# Patient Record
Sex: MALE | Race: OTHER | Employment: UNEMPLOYED | URBAN - METROPOLITAN AREA
[De-identification: names, ages, dates, MRNs, and addresses within clinical notes are randomized per-mention and may not be internally consistent; named-entity substitution may affect disease eponyms.]

---

## 2024-03-11 ENCOUNTER — HOSPITAL ENCOUNTER (EMERGENCY)
Facility: HOSPITAL | Age: 34
Discharge: HOME | End: 2024-03-11
Attending: EMERGENCY MEDICINE | Admitting: EMERGENCY MEDICINE

## 2024-03-11 ENCOUNTER — APPOINTMENT (EMERGENCY)
Dept: RADIOLOGY | Facility: HOSPITAL | Age: 34
End: 2024-03-11

## 2024-03-11 VITALS
TEMPERATURE: 99.2 F | OXYGEN SATURATION: 95 % | SYSTOLIC BLOOD PRESSURE: 134 MMHG | RESPIRATION RATE: 18 BRPM | WEIGHT: 208.78 LBS | DIASTOLIC BLOOD PRESSURE: 79 MMHG | HEART RATE: 114 BPM

## 2024-03-11 DIAGNOSIS — R06.00 DYSPNEA: ICD-10-CM

## 2024-03-11 DIAGNOSIS — J45.901 ASTHMA EXACERBATION: Primary | ICD-10-CM

## 2024-03-11 DIAGNOSIS — I10 HTN (HYPERTENSION): ICD-10-CM

## 2024-03-11 LAB
ALBUMIN SERPL BCP-MCNC: 4.8 G/DL (ref 3.5–5)
ALP SERPL-CCNC: 57 U/L (ref 34–104)
ALT SERPL W P-5'-P-CCNC: 72 U/L (ref 7–52)
ANION GAP SERPL CALCULATED.3IONS-SCNC: 10 MMOL/L
AST SERPL W P-5'-P-CCNC: 27 U/L (ref 13–39)
ATRIAL RATE: 135 BPM
BASOPHILS # BLD AUTO: 0.06 THOUSANDS/ÂΜL (ref 0–0.1)
BASOPHILS NFR BLD AUTO: 1 % (ref 0–1)
BILIRUB SERPL-MCNC: 0.47 MG/DL (ref 0.2–1)
BUN SERPL-MCNC: 7 MG/DL (ref 5–25)
CALCIUM SERPL-MCNC: 9.7 MG/DL (ref 8.4–10.2)
CARDIAC TROPONIN I PNL SERPL HS: <2 NG/L
CHLORIDE SERPL-SCNC: 103 MMOL/L (ref 96–108)
CO2 SERPL-SCNC: 23 MMOL/L (ref 21–32)
CREAT SERPL-MCNC: 0.71 MG/DL (ref 0.6–1.3)
EOSINOPHIL # BLD AUTO: 1.16 THOUSAND/ÂΜL (ref 0–0.61)
EOSINOPHIL NFR BLD AUTO: 12 % (ref 0–6)
ERYTHROCYTE [DISTWIDTH] IN BLOOD BY AUTOMATED COUNT: 14.6 % (ref 11.6–15.1)
FLUAV RNA RESP QL NAA+PROBE: NEGATIVE
FLUBV RNA RESP QL NAA+PROBE: NEGATIVE
GFR SERPL CREATININE-BSD FRML MDRD: 125 ML/MIN/1.73SQ M
GLUCOSE SERPL-MCNC: 111 MG/DL (ref 65–140)
HCT VFR BLD AUTO: 47.1 % (ref 36.5–49.3)
HGB BLD-MCNC: 15.5 G/DL (ref 12–17)
IMM GRANULOCYTES # BLD AUTO: 0.03 THOUSAND/UL (ref 0–0.2)
IMM GRANULOCYTES NFR BLD AUTO: 0 % (ref 0–2)
LYMPHOCYTES # BLD AUTO: 2.86 THOUSANDS/ÂΜL (ref 0.6–4.47)
LYMPHOCYTES NFR BLD AUTO: 30 % (ref 14–44)
MAGNESIUM SERPL-MCNC: 2.1 MG/DL (ref 1.9–2.7)
MCH RBC QN AUTO: 26.9 PG (ref 26.8–34.3)
MCHC RBC AUTO-ENTMCNC: 32.9 G/DL (ref 31.4–37.4)
MCV RBC AUTO: 82 FL (ref 82–98)
MONOCYTES # BLD AUTO: 0.64 THOUSAND/ÂΜL (ref 0.17–1.22)
MONOCYTES NFR BLD AUTO: 7 % (ref 4–12)
NEUTROPHILS # BLD AUTO: 4.9 THOUSANDS/ÂΜL (ref 1.85–7.62)
NEUTS SEG NFR BLD AUTO: 50 % (ref 43–75)
NRBC BLD AUTO-RTO: 0 /100 WBCS
P AXIS: 84 DEGREES
PLATELET # BLD AUTO: 337 THOUSANDS/UL (ref 149–390)
PMV BLD AUTO: 10.7 FL (ref 8.9–12.7)
POTASSIUM SERPL-SCNC: 3.9 MMOL/L (ref 3.5–5.3)
PR INTERVAL: 138 MS
PROT SERPL-MCNC: 8.3 G/DL (ref 6.4–8.4)
QRS AXIS: 82 DEGREES
QRSD INTERVAL: 82 MS
QT INTERVAL: 298 MS
QTC INTERVAL: 447 MS
RBC # BLD AUTO: 5.76 MILLION/UL (ref 3.88–5.62)
RSV RNA RESP QL NAA+PROBE: NEGATIVE
SARS-COV-2 RNA RESP QL NAA+PROBE: NEGATIVE
SODIUM SERPL-SCNC: 136 MMOL/L (ref 135–147)
T WAVE AXIS: 46 DEGREES
VENTRICULAR RATE: 135 BPM
WBC # BLD AUTO: 9.65 THOUSAND/UL (ref 4.31–10.16)

## 2024-03-11 PROCEDURE — 96361 HYDRATE IV INFUSION ADD-ON: CPT

## 2024-03-11 PROCEDURE — 80053 COMPREHEN METABOLIC PANEL: CPT | Performed by: EMERGENCY MEDICINE

## 2024-03-11 PROCEDURE — 96375 TX/PRO/DX INJ NEW DRUG ADDON: CPT

## 2024-03-11 PROCEDURE — 94644 CONT INHLJ TX 1ST HOUR: CPT

## 2024-03-11 PROCEDURE — 0241U HB NFCT DS VIR RESP RNA 4 TRGT: CPT | Performed by: EMERGENCY MEDICINE

## 2024-03-11 PROCEDURE — 84484 ASSAY OF TROPONIN QUANT: CPT | Performed by: EMERGENCY MEDICINE

## 2024-03-11 PROCEDURE — 71045 X-RAY EXAM CHEST 1 VIEW: CPT

## 2024-03-11 PROCEDURE — 96365 THER/PROPH/DIAG IV INF INIT: CPT

## 2024-03-11 PROCEDURE — 99285 EMERGENCY DEPT VISIT HI MDM: CPT

## 2024-03-11 PROCEDURE — 83735 ASSAY OF MAGNESIUM: CPT | Performed by: EMERGENCY MEDICINE

## 2024-03-11 PROCEDURE — 99285 EMERGENCY DEPT VISIT HI MDM: CPT | Performed by: EMERGENCY MEDICINE

## 2024-03-11 PROCEDURE — 85025 COMPLETE CBC W/AUTO DIFF WBC: CPT | Performed by: EMERGENCY MEDICINE

## 2024-03-11 PROCEDURE — 93005 ELECTROCARDIOGRAM TRACING: CPT

## 2024-03-11 PROCEDURE — 93010 ELECTROCARDIOGRAM REPORT: CPT

## 2024-03-11 PROCEDURE — 36415 COLL VENOUS BLD VENIPUNCTURE: CPT | Performed by: EMERGENCY MEDICINE

## 2024-03-11 RX ORDER — MAGNESIUM SULFATE HEPTAHYDRATE 40 MG/ML
2 INJECTION, SOLUTION INTRAVENOUS ONCE
Status: COMPLETED | OUTPATIENT
Start: 2024-03-11 | End: 2024-03-11

## 2024-03-11 RX ORDER — ALBUTEROL SULFATE 90 UG/1
2 AEROSOL, METERED RESPIRATORY (INHALATION) EVERY 6 HOURS PRN
Qty: 6.7 G | Refills: 0 | Status: SHIPPED | OUTPATIENT
Start: 2024-03-11

## 2024-03-11 RX ORDER — ALBUTEROL SULFATE 90 UG/1
2 AEROSOL, METERED RESPIRATORY (INHALATION) ONCE
Status: COMPLETED | OUTPATIENT
Start: 2024-03-11 | End: 2024-03-11

## 2024-03-11 RX ORDER — PREDNISONE 20 MG/1
60 TABLET ORAL DAILY
Qty: 12 TABLET | Refills: 0 | Status: SHIPPED | OUTPATIENT
Start: 2024-03-11 | End: 2024-03-15

## 2024-03-11 RX ORDER — ACETAMINOPHEN 325 MG/1
650 TABLET ORAL ONCE
Status: COMPLETED | OUTPATIENT
Start: 2024-03-11 | End: 2024-03-11

## 2024-03-11 RX ORDER — METHYLPREDNISOLONE SODIUM SUCCINATE 125 MG/2ML
125 INJECTION, POWDER, LYOPHILIZED, FOR SOLUTION INTRAMUSCULAR; INTRAVENOUS ONCE
Status: COMPLETED | OUTPATIENT
Start: 2024-03-11 | End: 2024-03-11

## 2024-03-11 RX ORDER — SODIUM CHLORIDE FOR INHALATION 0.9 %
12 VIAL, NEBULIZER (ML) INHALATION ONCE
Status: COMPLETED | OUTPATIENT
Start: 2024-03-11 | End: 2024-03-11

## 2024-03-11 RX ADMIN — Medication 12 ML: at 14:51

## 2024-03-11 RX ADMIN — METHYLPREDNISOLONE SODIUM SUCCINATE 125 MG: 125 INJECTION, POWDER, FOR SOLUTION INTRAMUSCULAR; INTRAVENOUS at 14:46

## 2024-03-11 RX ADMIN — ACETAMINOPHEN 325MG 650 MG: 325 TABLET ORAL at 14:49

## 2024-03-11 RX ADMIN — IPRATROPIUM BROMIDE 1 MG: 0.5 SOLUTION RESPIRATORY (INHALATION) at 14:51

## 2024-03-11 RX ADMIN — SODIUM CHLORIDE 1000 ML: 0.9 INJECTION, SOLUTION INTRAVENOUS at 14:47

## 2024-03-11 RX ADMIN — ALBUTEROL SULFATE 2 PUFF: 90 AEROSOL, METERED RESPIRATORY (INHALATION) at 17:21

## 2024-03-11 RX ADMIN — MAGNESIUM SULFATE IN WATER 2 G: 40 INJECTION, SOLUTION INTRAVENOUS at 14:47

## 2024-03-11 RX ADMIN — ALBUTEROL SULFATE 10 MG: 2.5 SOLUTION RESPIRATORY (INHALATION) at 14:51

## 2024-03-11 NOTE — ED PROVIDER NOTES
History  Chief Complaint   Patient presents with    Shortness of Breath     SOB,congestion, cough for 1 week. Reports chest pain. Hx of asthma      Patient is a 33-year-old male Palauan-speaking only used  services/care, coming in today with cough, congestion and shortness of breath with chest tightness that has progressively worsened over the past week.  Patient has a history of asthma where he has never been intubated for.  He has not been hospitalized for this in the past.  He reports that initially his symptoms were not horrible and he was taking his MDI nebulizers at home.  However over the past day progressively worsened where he is having difficulty breathing and had some chest pressure.  He has no fevers, chills, nausea, vomiting, diarrhea.  He has no recent travel or sick contacts.  He has a cough with sputum production but unknown if there is any color change to it.  He has no history of PE or DVT.    Chart review does show that patient was seen at his PCP at [facility] in February 20, 2024 and diagnosed with moderate persistent asthma patient per chart review shows that he is supposed to be on Wixela-Inhub, lisinopril as well as Breo Ellipta.  At the time of his visit he was on steroids day 4 out of 5.  Also noted patient has had 3 ER visits separative this for his asthma from November 2023 until today      History provided by:  Medical records and patient   used: Yes ([id number])    Shortness of Breath  Severity:  Moderate  Onset quality:  Gradual  Duration:  1 week  Timing:  Constant  Progression:  Worsening  Chronicity:  New  Context: not activity, not animal exposure, not emotional upset, not fumes, not known allergens, not occupational exposure, not pollens, not smoke exposure, not strong odors and not URI    Relieved by:  Nothing  Worsened by:  Nothing  Ineffective treatments:  Inhaler and rest  Associated symptoms: chest pain, cough, sputum production and  wheezing    Associated symptoms: no abdominal pain, no claudication, no diaphoresis, no ear pain, no fever, no headaches, no hemoptysis, no neck pain, no PND, no rash, no sore throat, no syncope, no swollen glands and no vomiting    Risk factors: no recent alcohol use, no family hx of DVT, no hx of cancer, no hx of PE/DVT, no obesity, no prolonged immobilization, no recent surgery and no tobacco use        None       Past Medical History:   Diagnosis Date    Asthma        No past surgical history on file.    No family history on file.  I have reviewed and agree with the history as documented.    E-Cigarette/Vaping     E-Cigarette/Vaping Substances     Social History     Tobacco Use    Smoking status: Never    Smokeless tobacco: Never   Substance Use Topics    Drug use: Never       Review of Systems   Constitutional: Negative.  Negative for chills, diaphoresis and fever.   HENT: Negative.  Negative for ear pain and sore throat.    Eyes:  Negative for pain and visual disturbance.   Respiratory:  Positive for cough, sputum production, shortness of breath and wheezing. Negative for hemoptysis.    Cardiovascular:  Positive for chest pain. Negative for palpitations, claudication, syncope and PND.   Gastrointestinal: Negative.  Negative for abdominal pain and vomiting.   Genitourinary: Negative.  Negative for dysuria and hematuria.   Musculoskeletal: Negative.  Negative for arthralgias, back pain and neck pain.   Skin: Negative.  Negative for color change and rash.   Neurological: Negative.  Negative for seizures, syncope and headaches.   Hematological: Negative.    Psychiatric/Behavioral: Negative.     All other systems reviewed and are negative.      Physical Exam  Physical Exam  Vitals and nursing note reviewed.   Constitutional:       General: He is not in acute distress.     Appearance: He is well-developed.   HENT:      Head: Normocephalic and atraumatic.      Comments: Patient maintaining airway and secretions. No  stridor . No brawniness under tongue.         Eyes:      Extraocular Movements: Extraocular movements intact.      Conjunctiva/sclera: Conjunctivae normal.      Pupils: Pupils are equal, round, and reactive to light.   Neck:      Comments:    Cardiovascular:      Rate and Rhythm: Regular rhythm. Tachycardia present.      Pulses:           Radial pulses are 2+ on the right side and 2+ on the left side.        Dorsalis pedis pulses are 2+ on the right side and 2+ on the left side.      Heart sounds: Normal heart sounds, S1 normal and S2 normal. No murmur heard.  Pulmonary:      Effort: Pulmonary effort is normal. Tachypnea present. No respiratory distress.      Breath sounds: Examination of the right-upper field reveals wheezing. Examination of the left-upper field reveals wheezing. Examination of the right-middle field reveals wheezing. Examination of the left-middle field reveals wheezing. Examination of the right-lower field reveals wheezing. Examination of the left-lower field reveals wheezing. Wheezing present.      Comments: Moderate conversational dyspnea.  Abdominal:      Palpations: Abdomen is soft.      Tenderness: There is no abdominal tenderness.   Musculoskeletal:         General: No swelling.      Cervical back: Neck supple.      Right lower leg: No edema.      Left lower leg: No edema.   Lymphadenopathy:      Cervical: No cervical adenopathy.   Skin:     General: Skin is warm and dry.      Capillary Refill: Capillary refill takes less than 2 seconds.   Neurological:      General: No focal deficit present.      Mental Status: He is alert and oriented to person, place, and time.      GCS: GCS eye subscore is 4. GCS verbal subscore is 5. GCS motor subscore is 6.      Cranial Nerves: Cranial nerves 2-12 are intact.      Sensory: Sensation is intact.      Motor: Motor function is intact.      Coordination: Coordination is intact.      Comments: No slurred speech.  No facial asymmetry.  No tongue deviation    Psychiatric:         Attention and Perception: Attention and perception normal.         Mood and Affect: Mood normal.         Vital Signs  ED Triage Vitals   Temperature Pulse Respirations Blood Pressure SpO2   03/11/24 1439 03/11/24 1421 03/11/24 1421 03/11/24 1421 03/11/24 1421   99.2 °F (37.3 °C) (!) 127 (!) 26 (!) 173/110 91 %      Temp Source Heart Rate Source Patient Position - Orthostatic VS BP Location FiO2 (%)   03/11/24 1421 03/11/24 1421 03/11/24 1421 03/11/24 1421 --   Oral Monitor Sitting Right arm       Pain Score       03/11/24 1449       8           Vitals:    03/11/24 1421 03/11/24 1545 03/11/24 1615 03/11/24 1645   BP: (!) 173/110 136/75 135/81 134/79   Pulse: (!) 127 (!) 106 (!) 108 (!) 114   Patient Position - Orthostatic VS: Sitting Sitting Sitting Sitting         Visual Acuity      ED Medications  Medications   sodium chloride 0.9 % bolus 1,000 mL (1,000 mL Intravenous Not Given 3/11/24 1704)   sodium chloride 0.9 % bolus 1,000 mL (0 mL Intravenous Stopped 3/11/24 1630)   albuterol inhalation solution 10 mg (10 mg Nebulization Given 3/11/24 1451)   ipratropium (ATROVENT) 0.02 % inhalation solution 1 mg (1 mg Nebulization Given 3/11/24 1451)   sodium chloride 0.9 % inhalation solution 12 mL (12 mL Nebulization Given 3/11/24 1451)   magnesium sulfate 2 g/50 mL IVPB (premix) 2 g (0 g Intravenous Stopped 3/11/24 1517)   methylPREDNISolone sodium succinate (Solu-MEDROL) injection 125 mg (125 mg Intravenous Given 3/11/24 1446)   acetaminophen (TYLENOL) tablet 650 mg (650 mg Oral Given 3/11/24 1449)   albuterol (PROVENTIL HFA,VENTOLIN HFA) inhaler 2 puff (2 puffs Inhalation Given 3/11/24 1721)       Diagnostic Studies  Results Reviewed       Procedure Component Value Units Date/Time    FLU/RSV/COVID - if FLU/RSV clinically relevant [[id number]]  (Normal) Collected: 03/11/24 1443    Lab Status: Final result Specimen: Nares from Nose Updated: 03/11/24 2082     SARS-CoV-2 Negative     INFLUENZA A PCR  Negative     INFLUENZA B PCR Negative     RSV PCR Negative    Narrative:      FOR PEDIATRIC PATIENTS - copy/paste COVID Guidelines URL to browser: [email or web address]    SARS-CoV-2 assay is a Nucleic Acid Amplification assay intended for the  qualitative detection of nucleic acid from SARS-CoV-2 in nasopharyngeal  swabs. Results are for the presumptive identification of SARS-CoV-2 RNA.    Positive results are indicative of infection with SARS-CoV-2, the virus  causing COVID-19, but do not rule out bacterial infection or co-infection  with other viruses. Laboratories within the [place] and its  territories are required to report all positive results to the appropriate  public health authorities. Negative results do not preclude SARS-CoV-2  infection and should not be used as the sole basis for treatment or other  patient management decisions. Negative results must be combined with  clinical observations, patient history, and epidemiological information.  This test has not been FDA cleared or approved.    This test has been authorized by FDA under an Emergency Use Authorization  (EUA). This test is only authorized for the duration of time the  declaration that circumstances exist justifying the authorization of the  emergency use of an in vitro diagnostic tests for detection of SARS-CoV-2  virus and/or diagnosis of COVID-19 infection under section 564(b)(1) of  the Act, 21 U.S.C. 360bbb-3(b)(1), unless the authorization is terminated  or revoked sooner. The test has been validated but independent review by FDA  and CLIA is pending.    Test performed using [employer] GeneXpert: This RT-PCR assay targets N2,  a region unique to SARS-CoV-2. A conserved region in the E-gene was chosen  for pan-Sarbecovirus detection which includes SARS-CoV-2.    According to CMS-2020-01-R, this platform meets the definition of high-throughput technology.    HS Troponin 0hr (reflex  protocol) [[id number]]  (Normal) Collected: 03/11/24 1451    Lab Status: Final result Specimen: Blood from Hand, Right Updated: 03/11/24 1518     hs TnI 0hr <2 ng/L     Comprehensive metabolic panel [[id number]]  (Abnormal) Collected: 03/11/24 1451    Lab Status: Final result Specimen: Blood from Hand, Right Updated: 03/11/24 1511     Sodium 136 mmol/L      Potassium 3.9 mmol/L      Chloride 103 mmol/L      CO2 23 mmol/L      ANION GAP 10 mmol/L      BUN 7 mg/dL      Creatinine 0.71 mg/dL      Glucose 111 mg/dL      Calcium 9.7 mg/dL      AST 27 U/L      ALT 72 U/L      Alkaline Phosphatase 57 U/L      Total Protein 8.3 g/dL      Albumin 4.8 g/dL      Total Bilirubin 0.47 mg/dL      eGFR 125 ml/min/1.73sq m     Narrative:      National Kidney Disease Foundation guidelines for Chronic Kidney Disease (CKD):     Stage 1 with normal or high GFR (GFR > 90 mL/min/1.73 square meters)    Stage 2 Mild CKD (GFR = 60-89 mL/min/1.73 square meters)    Stage 3A Moderate CKD (GFR = 45-59 mL/min/1.73 square meters)    Stage 3B Moderate CKD (GFR = 30-44 mL/min/1.73 square meters)    Stage 4 Severe CKD (GFR = 15-29 mL/min/1.73 square meters)    Stage 5 End Stage CKD (GFR <15 mL/min/1.73 square meters)  Note: GFR calculation is accurate only with a steady state creatinine    Magnesium [[id number]]  (Normal) Collected: 03/11/24 1451    Lab Status: Final result Specimen: Blood from Hand, Right Updated: 03/11/24 1511     Magnesium 2.1 mg/dL     CBC and differential [[id number]]  (Abnormal) Collected: 03/11/24 1451    Lab Status: Final result Specimen: Blood from Hand, Right Updated: 03/11/24 1457     WBC 9.65 Thousand/uL      RBC 5.76 Million/uL      Hemoglobin 15.5 g/dL      Hematocrit 47.1 %      MCV 82 fL      MCH 26.9 pg      MCHC 32.9 g/dL      RDW 14.6 %      MPV 10.7 fL      Platelets 337 Thousands/uL      nRBC 0 /100 WBCs      Neutrophils Relative 50 %      Immat GRANS % 0 %      Lymphocytes Relative 30 %      Monocytes Relative 7 %   "    Eosinophils Relative 12 %      Basophils Relative 1 %      Neutrophils Absolute 4.90 Thousands/µL      Immature Grans Absolute 0.03 Thousand/uL      Lymphocytes Absolute 2.86 Thousands/µL      Monocytes Absolute 0.64 Thousand/µL      Eosinophils Absolute 1.16 Thousand/µL      Basophils Absolute 0.06 Thousands/µL                    XR chest 1 view portable   ED Interpretation by Radha Moralez DO (03/11 1541)   No acute findings                 Procedures  Procedures         ED Course  ED Course as of 03/11/24 1825   Mon Mar 11, 2024   1441 Patient is a 33-year-old male Nauruan-speaking only coming in today with chest pain shortness of breath is progressively worsening.  On exam he is tachypneic and tachycardic with diffuse wheezing.  Will start hour-long, Solu-Medrol, magnesium, IV fluids EKG and lab work including chest x-ray and flu/COVID    Disclosure: Voice to text software was used in the preparation of this document and could have resulted in translational errors.      Occasional wrong word or \"sound a like\" substitutions may have occurred due to the inherent limitations of voice recognition software.  Read the chart carefully and recognize, using context, where substitutions have occurred.       I have independently reviewed external records are available to me to the level of detail possible within the time constraints of my patient care responsibilities in the ED.       1522 Labs reviewed and without actionable derangement    Pending flu COVID RSV.  Troponin less than 2 with a heart score less than 3   1542 Patient resting in bed.  Patient resting more comfortably and feels better.  He has decreased work of breathing   1628 Patient resting in bed.  Vital signs improved.  Patient has less than 10 minutes on heart neb.   1706 Patient asking to leave. He did ambulate throughout the ed with o2 92-94% without RICHTER.     # [id number].  Used  services.  Patient states that he feels better and wishes to " go.  Long discussion with him regarding workup as well as COVID flu RSV testing lab testing EKG and chest x-ray.    Patient states that his family doctor did place a referral for pulmonology.  Instructed to follow-up with his PCP as well as to call pulmonology for closer follow-up.  He states he needs a referral on one of his inhalers but he is not sure which one.    Patient is able to converse with me in no respiratory distress.  Work of breathing has significantly improved.  No wheezing on my exam.    Counseling: I had a detailed discussion with the patient and/or guardian regarding: the historical points, exam findings, and any diagnostic results supporting the discharge diagnosis, lab results, radiology results, discharge instructions reviewed with patient and/or family/caregiver and understanding was verbalized. Instructions given to return to the emergency department if symptoms worsen or persist, or if there are any questions or concerns that arise at home.     All imaging and/or lab testing discussed with patient, strict return to ED precautions discussed. Patient recommended to follow up promptly with appropriate outpatient provider. Patient and/or family members verbalizes understanding and agrees with plan. Patient and/or family members were given opportunity to ask questions, all questions were answered at this time. Patient is stable for discharge                 HEART Risk Score      Flowsheet Row Most Recent Value   Heart Score Risk Calculator    History 0 Filed at: 03/11/2024 1522   ECG 1 Filed at: 03/11/2024 1522   Age 0 Filed at: 03/11/2024 1522   Risk Factors 1 Filed at: 03/11/2024 1522   Troponin 0 Filed at: 03/11/2024 1522   HEART Score 2 Filed at: 03/11/2024 1522                          SBIRT 20yo+      Flowsheet Row Most Recent Value   Initial Alcohol Screen: US AUDIT-C     1. How often do you have a drink containing alcohol? 2 Filed at: 03/11/2024 0942   2. How many drinks containing alcohol  do you have on a typical day you are drinking?  1 Filed at: 03/11/2024 1437   3a. Male UNDER 65: How often do you have five or more drinks on one occasion? 0 Filed at: 03/11/2024 1437   3b. FEMALE Any Age, or MALE 65+: How often do you have 4 or more drinks on one occassion? 0 Filed at: 03/11/2024 1437   Audit-C Score 3 Filed at: 03/11/2024 1437   TREMAINE: How many times in the past year have you...    Used an illegal drug or used a prescription medication for non-medical reasons? Never Filed at: 03/11/2024 1437                      Medical Decision Making      EKG INTERPRETATION@1442  RHYTHM: Sinus tachycardia 130 bpm  AXIS: Normal axis   INTERVALS: TX interval measured at 138 ms  QRS COMPLEX: QRS measured at 82 ms  ST SEGMENT: Diffuse artifact.  Nonspecific ST segment changes  QT INTERVAL: QTc measured at 447 ms  COMPARED WITH PRIOR no old to compare  Interpretation by Radha Moralez, DO    Differential diagnosis includes but not limited to:  COPD exacerbation, asthma exacerbation, pneumonia, PE, pulmonary edema, pulmonary effusions, lung mass/malignancy, CHF, ACS, NSTEMI, acute kidney injury, electrolyte dysfunction, inhalation injury, anemia, valvular disorder, viral etiology,    Physical exam as well as history more consistent with acute as asthma exacerbation with possible underlying viral versus bacterial/pneumonia had some chest discomfort however given that patient is in acute asthma exacerbation presumed due to versus ischemia/acute coronary syndrome.  Did consider PE however patient's symptoms are more consistent and likelihood is lower given patient's age, comorbidities no recent trauma no history of PE DVT.  However if patient symptoms improved and he still feels short of breath and tachycardic despite interventions to consider and discussed with him testing for set      Amount and/or Complexity of Data Reviewed  External Data Reviewed: notes.  Labs: ordered. Decision-making details documented in ED  Course.     Details: COVID flu RSV negative  No anemia, thrombocytopenia or leukocytosis  No acute kidney injury or electrolyte dysfunction  Troponin less than 2 with heart score less than 3    Radiology: ordered and independent interpretation performed. Decision-making details documented in ED Course.     Details: Chest x-ray interpreted by myself at no acute findings  ECG/medicine tests: ordered and independent interpretation performed. Decision-making details documented in ED Course.     Details: Sinus tachycardia without ischemia or arrhythmia    Risk  OTC drugs.  Prescription drug management.             Disposition  Final diagnoses:   Asthma exacerbation   Dyspnea   HTN (hypertension)     Time reflects when diagnosis was documented in both MDM as applicable and the Disposition within this note       Time User Action Codes Description Comment    3/11/2024  5:07 PM Bendock, Radha L Add [J45.901] Asthma exacerbation     3/11/2024  5:07 PM Bendock, Radha L Add [R06.00] Dyspnea     3/11/2024  5:15 PM Bendock, Radha L Add [I10] HTN (hypertension)           ED Disposition       ED Disposition   Discharge    Condition   Stable    Date/Time   Mon Mar 11, 2024 1707    Comment   Lynn Gill discharge to home/self care.                   Follow-up Information       Follow up With Specialties Details Why Contact Info Additional Information    [facility] Schedule an appointment as soon as possible for a visit in 1 week  [address] [id number] [phone] [facility], [address] [place] [address], [place], [place], [id number] [phone]            Discharge Medication List as of 3/11/2024  5:15 PM        START taking these medications    Details   albuterol (Proventil HFA) 90 mcg/act inhaler Inhale 2 puffs every 6 (six) hours as needed for wheezing, Starting Mon 3/11/2024, Normal       predniSONE 20 mg tablet Take 3 tablets (60 mg total) by mouth daily for 4 days, Starting Mon 3/11/2024, Until Fri 3/15/2024, Normal                 PDMP Review       None            ED Provider  Electronically Signed by             Radha Moralez DO  03/11/24 8278

## 2024-03-11 NOTE — DISCHARGE INSTRUCTIONS
[place] se ha comentado, usted tuvo ti exacerbación de brasher asma.    Recoja en la farmacia brasher inhalador y esteroides.    Llame a brasher médico de cabecera para un seguimiento minucioso.    Llame a los números anteriores o sami un seguimiento con el neumólogo/especialista en pulmones al que brasher médico de cabecera le refiera    [place] el lisinopril que brasher médico de cabecera le recetó para la presión arterial

## 2024-03-12 LAB
ATRIAL RATE: 113 BPM
P AXIS: 63 DEGREES
PR INTERVAL: 152 MS
QRS AXIS: 73 DEGREES
QRSD INTERVAL: 90 MS
QT INTERVAL: 328 MS
QTC INTERVAL: 449 MS
T WAVE AXIS: 28 DEGREES
VENTRICULAR RATE: 113 BPM

## 2024-03-12 PROCEDURE — 93010 ELECTROCARDIOGRAM REPORT: CPT

## 2024-04-03 ENCOUNTER — TELEPHONE (OUTPATIENT)
Age: 34
End: 2024-04-03

## 2024-04-19 ENCOUNTER — TELEPHONE (OUTPATIENT)
Age: 34
End: 2024-04-19

## 2024-04-24 ENCOUNTER — APPOINTMENT (EMERGENCY)
Dept: RADIOLOGY | Facility: HOSPITAL | Age: 34
End: 2024-04-24

## 2024-04-24 ENCOUNTER — HOSPITAL ENCOUNTER (EMERGENCY)
Facility: HOSPITAL | Age: 34
Discharge: HOME | End: 2024-04-24
Attending: EMERGENCY MEDICINE

## 2024-04-24 VITALS
TEMPERATURE: 99.2 F | RESPIRATION RATE: 20 BRPM | HEIGHT: 69 IN | DIASTOLIC BLOOD PRESSURE: 67 MMHG | HEART RATE: 103 BPM | OXYGEN SATURATION: 100 % | SYSTOLIC BLOOD PRESSURE: 120 MMHG | BODY MASS INDEX: 29.62 KG/M2 | WEIGHT: 199.96 LBS

## 2024-04-24 DIAGNOSIS — J45.901 ASTHMA EXACERBATION: Primary | ICD-10-CM

## 2024-04-24 LAB
ALBUMIN SERPL BCP-MCNC: 4.7 G/DL (ref 3.5–5)
ALP SERPL-CCNC: 53 U/L (ref 34–104)
ALT SERPL W P-5'-P-CCNC: 31 U/L (ref 7–52)
ANION GAP SERPL CALCULATED.3IONS-SCNC: 17 MMOL/L (ref 4–13)
AST SERPL W P-5'-P-CCNC: 24 U/L (ref 13–39)
ATRIAL RATE: 112 BPM
ATRIAL RATE: 132 BPM
BASOPHILS # BLD AUTO: 0.04 THOUSANDS/ÂΜL (ref 0–0.1)
BASOPHILS NFR BLD AUTO: 0 % (ref 0–1)
BILIRUB SERPL-MCNC: 0.4 MG/DL (ref 0.2–1)
BUN SERPL-MCNC: 9 MG/DL (ref 5–25)
CALCIUM SERPL-MCNC: 9.8 MG/DL (ref 8.4–10.2)
CHLORIDE SERPL-SCNC: 103 MMOL/L (ref 96–108)
CO2 SERPL-SCNC: 19 MMOL/L (ref 21–32)
CREAT SERPL-MCNC: 0.85 MG/DL (ref 0.6–1.3)
EOSINOPHIL # BLD AUTO: 0.92 THOUSAND/ÂΜL (ref 0–0.61)
EOSINOPHIL NFR BLD AUTO: 10 % (ref 0–6)
ERYTHROCYTE [DISTWIDTH] IN BLOOD BY AUTOMATED COUNT: 14.3 % (ref 11.6–15.1)
GFR SERPL CREATININE-BSD FRML MDRD: 116 ML/MIN/1.73SQ M
GLUCOSE SERPL-MCNC: 155 MG/DL (ref 65–140)
HCT VFR BLD AUTO: 47.6 % (ref 36.5–49.3)
HGB BLD-MCNC: 15.4 G/DL (ref 12–17)
IMM GRANULOCYTES # BLD AUTO: 0.04 THOUSAND/UL (ref 0–0.2)
IMM GRANULOCYTES NFR BLD AUTO: 0 % (ref 0–2)
LYMPHOCYTES # BLD AUTO: 2.13 THOUSANDS/ÂΜL (ref 0.6–4.47)
LYMPHOCYTES NFR BLD AUTO: 24 % (ref 14–44)
MCH RBC QN AUTO: 27.4 PG (ref 26.8–34.3)
MCHC RBC AUTO-ENTMCNC: 32.4 G/DL (ref 31.4–37.4)
MCV RBC AUTO: 85 FL (ref 82–98)
MONOCYTES # BLD AUTO: 0.43 THOUSAND/ÂΜL (ref 0.17–1.22)
MONOCYTES NFR BLD AUTO: 5 % (ref 4–12)
NEUTROPHILS # BLD AUTO: 5.47 THOUSANDS/ÂΜL (ref 1.85–7.62)
NEUTS SEG NFR BLD AUTO: 61 % (ref 43–75)
NRBC BLD AUTO-RTO: 0 /100 WBCS
P AXIS: 69 DEGREES
P AXIS: 79 DEGREES
PLATELET # BLD AUTO: 300 THOUSANDS/UL (ref 149–390)
PMV BLD AUTO: 11.2 FL (ref 8.9–12.7)
POTASSIUM SERPL-SCNC: 4.1 MMOL/L (ref 3.5–5.3)
PR INTERVAL: 142 MS
PR INTERVAL: 146 MS
PROT SERPL-MCNC: 8.1 G/DL (ref 6.4–8.4)
QRS AXIS: 86 DEGREES
QRS AXIS: 91 DEGREES
QRSD INTERVAL: 84 MS
QRSD INTERVAL: 88 MS
QT INTERVAL: 292 MS
QT INTERVAL: 328 MS
QTC INTERVAL: 432 MS
QTC INTERVAL: 447 MS
RBC # BLD AUTO: 5.62 MILLION/UL (ref 3.88–5.62)
SODIUM SERPL-SCNC: 139 MMOL/L (ref 135–147)
T WAVE AXIS: 60 DEGREES
T WAVE AXIS: 74 DEGREES
VENTRICULAR RATE: 112 BPM
VENTRICULAR RATE: 132 BPM
WBC # BLD AUTO: 9.03 THOUSAND/UL (ref 4.31–10.16)

## 2024-04-24 PROCEDURE — 94760 N-INVAS EAR/PLS OXIMETRY 1: CPT

## 2024-04-24 PROCEDURE — 36415 COLL VENOUS BLD VENIPUNCTURE: CPT | Performed by: EMERGENCY MEDICINE

## 2024-04-24 PROCEDURE — 80053 COMPREHEN METABOLIC PANEL: CPT | Performed by: EMERGENCY MEDICINE

## 2024-04-24 PROCEDURE — 94644 CONT INHLJ TX 1ST HOUR: CPT

## 2024-04-24 PROCEDURE — 96374 THER/PROPH/DIAG INJ IV PUSH: CPT

## 2024-04-24 PROCEDURE — 96361 HYDRATE IV INFUSION ADD-ON: CPT

## 2024-04-24 PROCEDURE — 93005 ELECTROCARDIOGRAM TRACING: CPT

## 2024-04-24 PROCEDURE — 93010 ELECTROCARDIOGRAM REPORT: CPT | Performed by: INTERNAL MEDICINE

## 2024-04-24 PROCEDURE — 85025 COMPLETE CBC W/AUTO DIFF WBC: CPT | Performed by: EMERGENCY MEDICINE

## 2024-04-24 PROCEDURE — 99285 EMERGENCY DEPT VISIT HI MDM: CPT

## 2024-04-24 PROCEDURE — 94664 DEMO&/EVAL PT USE INHALER: CPT

## 2024-04-24 PROCEDURE — 93010 ELECTROCARDIOGRAM REPORT: CPT | Performed by: STUDENT IN AN ORGANIZED HEALTH CARE EDUCATION/TRAINING PROGRAM

## 2024-04-24 PROCEDURE — 71045 X-RAY EXAM CHEST 1 VIEW: CPT

## 2024-04-24 PROCEDURE — 99284 EMERGENCY DEPT VISIT MOD MDM: CPT | Performed by: EMERGENCY MEDICINE

## 2024-04-24 PROCEDURE — 94640 AIRWAY INHALATION TREATMENT: CPT

## 2024-04-24 RX ORDER — ALBUTEROL SULFATE 90 UG/1
2 AEROSOL, METERED RESPIRATORY (INHALATION) EVERY 6 HOURS PRN
Qty: 18 G | Refills: 0 | Status: SHIPPED | OUTPATIENT
Start: 2024-04-24

## 2024-04-24 RX ORDER — METHYLPREDNISOLONE SODIUM SUCCINATE 125 MG/2ML
125 INJECTION, POWDER, LYOPHILIZED, FOR SOLUTION INTRAMUSCULAR; INTRAVENOUS ONCE
Status: COMPLETED | OUTPATIENT
Start: 2024-04-24 | End: 2024-04-24

## 2024-04-24 RX ORDER — PREDNISONE 20 MG/1
40 TABLET ORAL DAILY
Qty: 10 TABLET | Refills: 0 | Status: SHIPPED | OUTPATIENT
Start: 2024-04-24 | End: 2024-04-29

## 2024-04-24 RX ORDER — SODIUM CHLORIDE FOR INHALATION 0.9 %
12 VIAL, NEBULIZER (ML) INHALATION ONCE
Status: COMPLETED | OUTPATIENT
Start: 2024-04-24 | End: 2024-04-24

## 2024-04-24 RX ADMIN — ALBUTEROL SULFATE 10 MG: 2.5 SOLUTION RESPIRATORY (INHALATION) at 12:31

## 2024-04-24 RX ADMIN — ISODIUM CHLORIDE 12 ML: 0.03 SOLUTION RESPIRATORY (INHALATION) at 12:31

## 2024-04-24 RX ADMIN — METHYLPREDNISOLONE SODIUM SUCCINATE 125 MG: 125 INJECTION, POWDER, FOR SOLUTION INTRAMUSCULAR; INTRAVENOUS at 12:25

## 2024-04-24 RX ADMIN — IPRATROPIUM BROMIDE 1 MG: 0.5 SOLUTION RESPIRATORY (INHALATION) at 12:31

## 2024-04-24 RX ADMIN — SODIUM CHLORIDE 1000 ML: 0.9 INJECTION, SOLUTION INTRAVENOUS at 12:21

## 2024-04-24 NOTE — ED PROVIDER NOTES
History  Chief Complaint   Patient presents with    Shortness of Breath     Pt reports increase SOB starting last night. Pt has hx of asthma. Pt reports using inhaler with no relief. Pt is audibly wheezing in triage and has labored breathing.     33-year-old male with history of asthma presents with shortness of breath and wheezing since last night.  He is using his inhaler without relief.  He denies smoking.  No fevers, no cough.  His heart rate upon arrival to the ER is 140, his pulse ox is 92%, which is improved with 2 L nasal cannula and now 98%        Prior to Admission Medications   Prescriptions Last Dose Informant Patient Reported? Taking?   albuterol (Proventil HFA) 90 mcg/act inhaler   No No   Sig: Inhale 2 puffs every 6 (six) hours as needed for wheezing      Facility-Administered Medications: None       Past Medical History:   Diagnosis Date    Asthma        History reviewed. No pertinent surgical history.    History reviewed. No pertinent family history.  I have reviewed and agree with the history as documented.    E-Cigarette/Vaping     E-Cigarette/Vaping Substances     Social History     Tobacco Use    Smoking status: Never    Smokeless tobacco: Never   Substance Use Topics    Alcohol use: Not Currently     Comment: Occ    Drug use: Never       Review of Systems   Constitutional:  Negative for appetite change, fatigue and fever.   HENT:  Negative for rhinorrhea and sore throat.    Eyes:  Negative for pain.   Respiratory:  Positive for shortness of breath and wheezing. Negative for cough.    Cardiovascular:  Negative for chest pain and leg swelling.   Gastrointestinal:  Negative for abdominal pain, diarrhea and vomiting.   Genitourinary:  Negative for dysuria and flank pain.   Musculoskeletal:  Negative for back pain and neck pain.   Skin:  Negative for rash.   Neurological:  Negative for syncope and headaches.   Psychiatric/Behavioral:          Mood normal       Physical Exam  Physical Exam  Vitals  and nursing note reviewed.   Constitutional:       Appearance: He is well-developed.   HENT:      Head: Normocephalic and atraumatic.      Right Ear: External ear normal.      Left Ear: External ear normal.   Eyes:      General: No scleral icterus.     Extraocular Movements: Extraocular movements intact.   Cardiovascular:      Rate and Rhythm: Regular rhythm. Tachycardia present.   Pulmonary:      Comments: Slight increased work of breathing, diffuse inspiratory and expiratory wheezing  Abdominal:      Palpations: Abdomen is soft.      Tenderness: There is no abdominal tenderness.   Musculoskeletal:         General: No deformity or signs of injury. Normal range of motion.      Cervical back: Normal range of motion and neck supple.   Skin:     General: Skin is warm and dry.      Coloration: Skin is not jaundiced or pale.   Neurological:      General: No focal deficit present.      Mental Status: He is alert and oriented to person, place, and time.   Psychiatric:         Mood and Affect: Mood normal.         Behavior: Behavior normal.         Vital Signs  ED Triage Vitals   Temperature Pulse Respirations Blood Pressure SpO2   04/24/24 1326 04/24/24 1209 04/24/24 1209 04/24/24 1209 04/24/24 1209   99.2 °F (37.3 °C) (!) 140 22 (!) 166/102 94 %      Temp Source Heart Rate Source Patient Position - Orthostatic VS BP Location FiO2 (%)   04/24/24 1326 04/24/24 1209 04/24/24 1209 04/24/24 1209 --   Oral Monitor Sitting Left arm       Pain Score       --                  Vitals:    04/24/24 1209 04/24/24 1326 04/24/24 1538   BP: (!) 166/102 134/65 120/67   Pulse: (!) 140 (!) 117 103   Patient Position - Orthostatic VS: Sitting Lying Sitting         Visual Acuity      ED Medications  Medications   sodium chloride 0.9 % bolus 1,000 mL (0 mL Intravenous Stopped 4/24/24 1328)   methylPREDNISolone sodium succinate (Solu-MEDROL) injection 125 mg (125 mg Intravenous Given 4/24/24 1225)   albuterol inhalation solution 10 mg (10 mg  Nebulization Given 4/24/24 1231)   ipratropium (ATROVENT) 0.02 % inhalation solution 1 mg (1 mg Nebulization Given 4/24/24 1231)   sodium chloride 0.9 % inhalation solution 12 mL (12 mL Nebulization Given 4/24/24 1231)       Diagnostic Studies  Results Reviewed       Procedure Component Value Units Date/Time    Comprehensive metabolic panel [[id number]]  (Abnormal) Collected: 04/24/24 1229    Lab Status: Final result Specimen: Blood from Arm, Right Updated: 04/24/24 1530     Sodium 139 mmol/L      Potassium 4.1 mmol/L      Chloride 103 mmol/L      CO2 19 mmol/L      ANION GAP 17 mmol/L      BUN 9 mg/dL      Creatinine 0.85 mg/dL      Glucose 155 mg/dL      Calcium 9.8 mg/dL      AST 24 U/L      ALT 31 U/L      Alkaline Phosphatase 53 U/L      Total Protein 8.1 g/dL      Albumin 4.7 g/dL      Total Bilirubin 0.40 mg/dL      eGFR 116 ml/min/1.73sq m     Narrative:      National Kidney [employer] Foundation guidelines for Chronic Kidney Disease (CKD):     Stage 1 with normal or high GFR (GFR > 90 mL/min/1.73 square meters)    Stage 2 Mild CKD (GFR = 60-89 mL/min/1.73 square meters)    Stage 3A Moderate CKD (GFR = 45-59 mL/min/1.73 square meters)    Stage 3B Moderate CKD (GFR = 30-44 mL/min/1.73 square meters)    Stage 4 Severe CKD (GFR = 15-29 mL/min/1.73 square meters)    Stage 5 End Stage CKD (GFR <15 mL/min/1.73 square meters)  Note: GFR calculation is accurate only with a steady state creatinine    CBC and differential [[id number]]  (Abnormal) Collected: 04/24/24 1229    Lab Status: Final result Specimen: Blood from Arm, Right Updated: 04/24/24 1240     WBC 9.03 Thousand/uL      RBC 5.62 Million/uL      Hemoglobin 15.4 g/dL      Hematocrit 47.6 %      MCV 85 fL      MCH 27.4 pg      MCHC 32.4 g/dL      RDW 14.3 %      MPV 11.2 fL      Platelets 300 Thousands/uL      nRBC 0 /100 WBCs      Segmented % 61 %      Immature Grans % 0 %      Lymphocytes % 24 %      Monocytes % 5 %      Eosinophils Relative 10 %      Basophils  Relative 0 %      Absolute Neutrophils 5.47 Thousands/µL      Absolute Immature Grans 0.04 Thousand/uL      Absolute Lymphocytes 2.13 Thousands/µL      Absolute Monocytes 0.43 Thousand/µL      Eosinophils Absolute 0.92 Thousand/µL      Basophils Absolute 0.04 Thousands/µL                    XR chest 1 view portable   Final Result by Vida Fierro MD (04/24 1720)      No acute cardiopulmonary disease.            Workstation performed: [id number]                    Procedures  Procedures         ED Course                               SBIRT 20yo+      Flowsheet Row Most Recent Value   Initial Alcohol Screen: US AUDIT-C     1. How often do you have a drink containing alcohol? 2 Filed at: 04/24/2024 1255   2. How many drinks containing alcohol do you have on a typical day you are drinking?  1 Filed at: 04/24/2024 1255   3a. Male UNDER 65: How often do you have five or more drinks on one occasion? 0 Filed at: 04/24/2024 1255   3b. FEMALE Any Age, or MALE 65+: How often do you have 4 or more drinks on one occassion? 0 Filed at: 04/24/2024 1255   Audit-C Score 3 Filed at: 04/24/2024 1255   TREMAINE: How many times in the past year have you...    Used an illegal drug or used a prescription medication for non-medical reasons? Never Filed at: 04/24/2024 1255                      Medical Decision Making  33-year-old male with asthma exacerbation.  Will give a heart neb, Solu-Medrol, check labs and chest x-ray and reevaluate.    I went over the results with the patient.  He felt completely better after neb and meds.  Pulse ox is 100%, no wheezing on exam before discharge    Amount and/or Complexity of Data Reviewed  Labs: ordered.  Radiology: ordered.    Risk  Prescription drug management.             Disposition  Final diagnoses:   Asthma exacerbation     Time reflects when diagnosis was documented in both MDM as applicable and the Disposition within this note       Time User Action Codes Description Comment    4/24/2024   3:35 PM Lexi Reyes [J45.901] Asthma exacerbation           ED Disposition       ED Disposition   Discharge    Condition   Stable    Date/Time   Wed Apr 24, 2024 1535    Comment   Lynn Rodriguez discharge to home/self care.                   Follow-up Information       Follow up With Specialties Details Why Contact Info Additional Information    [address], [address] [place] [id number] [phone] [facility], [address] [place] [address], [place] [address], [place], [place], [address] [phone]            Discharge Medication List as of 4/24/2024  3:36 PM        START taking these medications    Details   !! albuterol (Ventolin HFA) 90 mcg/act inhaler Inhale 2 puffs every 6 (six) hours as needed for wheezing, Starting Wed 4/24/2024, Normal      predniSONE 20 mg tablet Take 2 tablets (40 mg total) by mouth daily for 5 days, Starting Wed 4/24/2024, Until Mon 4/29/2024, Normal       !! - Potential duplicate medications found. Please discuss with provider.        CONTINUE these medications which have NOT CHANGED    Details   !! albuterol (Proventil HFA) 90 mcg/act inhaler Inhale 2 puffs every 6 (six) hours as needed for wheezing, Starting Mon 3/11/2024, Normal       !! - Potential duplicate medications found. Please discuss with provider.          No discharge procedures on file.    PDMP Review       None            ED Provider  Electronically Signed by             Lexi Moreira MD  04/24/24 2760

## 2024-05-06 ENCOUNTER — HOSPITAL ENCOUNTER (EMERGENCY)
Facility: HOSPITAL | Age: 34
Discharge: HOME | End: 2024-05-06
Attending: EMERGENCY MEDICINE

## 2024-05-06 VITALS
HEART RATE: 118 BPM | OXYGEN SATURATION: 96 % | TEMPERATURE: 98.3 F | SYSTOLIC BLOOD PRESSURE: 121 MMHG | DIASTOLIC BLOOD PRESSURE: 82 MMHG | RESPIRATION RATE: 20 BRPM

## 2024-05-06 DIAGNOSIS — J45.901 ASTHMA EXACERBATION: Primary | ICD-10-CM

## 2024-05-06 PROCEDURE — 96365 THER/PROPH/DIAG IV INF INIT: CPT

## 2024-05-06 PROCEDURE — 94644 CONT INHLJ TX 1ST HOUR: CPT

## 2024-05-06 PROCEDURE — 94664 DEMO&/EVAL PT USE INHALER: CPT

## 2024-05-06 PROCEDURE — 96375 TX/PRO/DX INJ NEW DRUG ADDON: CPT

## 2024-05-06 PROCEDURE — 99284 EMERGENCY DEPT VISIT MOD MDM: CPT

## 2024-05-06 PROCEDURE — 96361 HYDRATE IV INFUSION ADD-ON: CPT

## 2024-05-06 RX ORDER — MAGNESIUM SULFATE HEPTAHYDRATE 40 MG/ML
2 INJECTION, SOLUTION INTRAVENOUS ONCE
Status: COMPLETED | OUTPATIENT
Start: 2024-05-06 | End: 2024-05-06

## 2024-05-06 RX ORDER — ALBUTEROL SULFATE 90 UG/1
2 AEROSOL, METERED RESPIRATORY (INHALATION) EVERY 6 HOURS PRN
Qty: 6.7 G | Refills: 0 | Status: SHIPPED | OUTPATIENT
Start: 2024-05-06 | End: 2024-05-20

## 2024-05-06 RX ORDER — SODIUM CHLORIDE FOR INHALATION 0.9 %
12 VIAL, NEBULIZER (ML) INHALATION ONCE
Status: COMPLETED | OUTPATIENT
Start: 2024-05-06 | End: 2024-05-06

## 2024-05-06 RX ORDER — METHYLPREDNISOLONE SODIUM SUCCINATE 125 MG/2ML
125 INJECTION, POWDER, LYOPHILIZED, FOR SOLUTION INTRAMUSCULAR; INTRAVENOUS ONCE
Status: COMPLETED | OUTPATIENT
Start: 2024-05-06 | End: 2024-05-06

## 2024-05-06 RX ORDER — ALBUTEROL SULFATE 90 UG/1
2 AEROSOL, METERED RESPIRATORY (INHALATION) ONCE
Status: COMPLETED | OUTPATIENT
Start: 2024-05-06 | End: 2024-05-06

## 2024-05-06 RX ORDER — ALBUTEROL SULFATE 2.5 MG/3ML
2.5 SOLUTION RESPIRATORY (INHALATION) EVERY 6 HOURS PRN
Qty: 75 ML | Refills: 0 | Status: SHIPPED | OUTPATIENT
Start: 2024-05-06

## 2024-05-06 RX ORDER — PREDNISONE 20 MG/1
50 TABLET ORAL DAILY
Qty: 13 TABLET | Refills: 0 | Status: SHIPPED | OUTPATIENT
Start: 2024-05-06 | End: 2024-05-11

## 2024-05-06 RX ADMIN — SODIUM CHLORIDE 1000 ML: 0.9 INJECTION, SOLUTION INTRAVENOUS at 02:55

## 2024-05-06 RX ADMIN — ISODIUM CHLORIDE 12 ML: 0.03 SOLUTION RESPIRATORY (INHALATION) at 02:50

## 2024-05-06 RX ADMIN — MAGNESIUM SULFATE HEPTAHYDRATE 2 G: 40 INJECTION, SOLUTION INTRAVENOUS at 03:00

## 2024-05-06 RX ADMIN — METHYLPREDNISOLONE SODIUM SUCCINATE 125 MG: 125 INJECTION, POWDER, FOR SOLUTION INTRAMUSCULAR; INTRAVENOUS at 02:56

## 2024-05-06 RX ADMIN — ALBUTEROL SULFATE 10 MG: 2.5 SOLUTION RESPIRATORY (INHALATION) at 02:50

## 2024-05-06 RX ADMIN — IPRATROPIUM BROMIDE 1 MG: 0.5 SOLUTION RESPIRATORY (INHALATION) at 02:50

## 2024-05-06 RX ADMIN — ALBUTEROL SULFATE 2 PUFF: 90 AEROSOL, METERED RESPIRATORY (INHALATION) at 04:44

## 2024-05-06 NOTE — ED PROVIDER NOTES
History  Chief Complaint   Patient presents with    Shortness of Breath     Pt reports asthma attack that began pta unrelieved by inhaler. Pt states he has a nebulizer at home but is out of medication. Pt working hard to breathe in triage      Patient is a 33-year-old male Bengali-speaking only used  services/care, coming in today with shortness of breath and chest tightness he describes as an asthma attacj.  Patient has a history of asthma where he has never been intubated for.  He has not been hospitalized for this in the past.  He reports he recently ran out of his at-home nebulizations, and his Albuterol inhaler was not providing relief of symptoms, prompting ED visit.  He has no fevers, chills, leg swelling, hemoptysis, vomiting, diarrhea.  He has no history of PE or DVT.     Chart review does show that patient was seen at his PCP at [facility] on 4/30 for moderate persistent asthma patient per chart review shows that he is supposed to be on Zyrtec, Wixela-Inhub, lisinopril as well as Breo Ellipta.  Also noted patient has had 5 ER visits separative this for his asthma from November 2023 until today. HPI was obtained using .        Prior to Admission Medications   Prescriptions Last Dose Informant Patient Reported? Taking?   albuterol (Proventil HFA) 90 mcg/act inhaler   No No   Sig: Inhale 2 puffs every 6 (six) hours as needed for wheezing   albuterol (Ventolin HFA) 90 mcg/act inhaler   No No   Sig: Inhale 2 puffs every 6 (six) hours as needed for wheezing      Facility-Administered Medications: None       Past Medical History:   Diagnosis Date    Asthma        History reviewed. No pertinent surgical history.    History reviewed. No pertinent family history.  I have reviewed and agree with the history as documented.    E-Cigarette/Vaping     E-Cigarette/Vaping Substances     Social History     Tobacco Use    Smoking status: Never    Smokeless tobacco: Never   Substance Use Topics     Alcohol use: Not Currently     Comment: Occ    Drug use: Never       Review of Systems   Constitutional:  Negative for chills and fever.   HENT:  Negative for congestion and rhinorrhea.    Respiratory:  Positive for chest tightness, shortness of breath and wheezing. Negative for cough.    Cardiovascular:  Negative for chest pain and leg swelling.   Gastrointestinal:  Negative for abdominal pain, constipation, diarrhea, nausea and vomiting.   Musculoskeletal:  Negative for arthralgias and myalgias.   Skin:  Negative for rash and wound.   Neurological:  Negative for dizziness, weakness, numbness and headaches.       Physical Exam  Physical Exam  Vitals and nursing note reviewed.   Constitutional:       General: He is not in acute distress.     Appearance: He is well-developed. He is ill-appearing. He is not toxic-appearing.   HENT:      Head: Normocephalic and atraumatic.   Eyes:      Conjunctiva/sclera: Conjunctivae normal.   Cardiovascular:      Rate and Rhythm: Regular rhythm. Tachycardia present.      Heart sounds: No murmur heard.  Pulmonary:      Effort: Pulmonary effort is normal. Tachypnea present. No respiratory distress.      Breath sounds: Decreased breath sounds and wheezing present.   Abdominal:      Palpations: Abdomen is soft.      Tenderness: There is no abdominal tenderness.   Musculoskeletal:         General: No swelling.      Cervical back: Neck supple.      Right lower leg: No tenderness. No edema.      Left lower leg: No tenderness. No edema.   Skin:     General: Skin is warm and dry.      Capillary Refill: Capillary refill takes less than 2 seconds.   Neurological:      Mental Status: He is alert.   Psychiatric:         Mood and Affect: Mood normal.         Vital Signs  ED Triage Vitals [05/06/24 0241]   Temperature Pulse Respirations Blood Pressure SpO2   98.3 °F (36.8 °C) (!) 131 (!) 26 154/99 (S) 91 %      Temp src Heart Rate Source Patient Position - Orthostatic VS BP Location FiO2 (%)    -- Monitor Sitting Right arm --      Pain Score       --           Vitals:    05/06/24 0241 05/06/24 0400 05/06/24 0436   BP: 154/99 121/82    Pulse: (!) 131 (!) 112 (!) 118   Patient Position - Orthostatic VS: Sitting Lying          Visual Acuity      ED Medications  Medications   albuterol inhalation solution 10 mg (10 mg Nebulization Given 5/6/24 0250)   ipratropium (ATROVENT) 0.02 % inhalation solution 1 mg (1 mg Nebulization Given 5/6/24 0250)   sodium chloride 0.9 % inhalation solution 12 mL (12 mL Nebulization Given 5/6/24 0250)   sodium chloride 0.9 % bolus 1,000 mL (0 mL Intravenous Stopped 5/6/24 0404)   magnesium sulfate 2 g/50 mL IVPB (premix) 2 g (0 g Intravenous Stopped 5/6/24 0320)   methylPREDNISolone sodium succinate (Solu-MEDROL) injection 125 mg (125 mg Intravenous Given 5/6/24 0256)   albuterol (PROVENTIL HFA,VENTOLIN HFA) inhaler 2 puff (2 puffs Inhalation Given 5/6/24 0444)       Diagnostic Studies  Results Reviewed       None                   No orders to display              Procedures  Procedures         ED Course         Medical Decision Making  DDx including but not limited to: asthma exacerbation, URI, bronchitis, pneumonia; doubt PTX, pneumomediastinum or cardiac etiology.     On exam patient is tachypneic and tachycardic with diffuse wheezing.  Will start hour-long, Solu-Medrol, magnesium, IV fluids    Following NEVES neb, patient with significant improvement in work of breathing, wheezing, and overall appearance.  Lungs clear to auscultation with good air movement.  Patient reports he does feel improved.  SpO2 100% on room air.  Ambulated patient, SpO2 remained 96% on room air.  Patient without dyspnea with walking.  Patient still with mild tachycardia, however suspect due to neb.  He denies any chest pain or dyspnea at this time.    At the time of discharge, the patient is in no acute distress. I discussed with the patient the diagnosis, treatment plan, follow-up, return  "precautions, and discharge instructions; they were given the opportunity to ask questions and verbalized understanding.        Problems Addressed:  Asthma exacerbation: acute illness or injury    Amount and/or Complexity of Data Reviewed  External Data Reviewed: labs and notes.    Risk  Prescription drug management.    Disclosure: Voice to text software was used in the preparation of this document and could have resulted in translational errors.    Occasional wrong word or \"sound a like\" substitutions may have occurred due to the inherent limitations of voice recognition software.  Read the chart carefully and recognize, using context, where substitutions have occurred.         Disposition  Final diagnoses:   Asthma exacerbation     Time reflects when diagnosis was documented in both MDM as applicable and the Disposition within this note       Time User Action Codes Description Comment    5/6/2024  4:33 AM Ebonie Gipson [J45.901] Asthma exacerbation           ED Disposition       ED Disposition   Discharge    Condition   Stable    Date/Time   Mon May 6, 2024  4:33 AM    Comment   Lynn Rodriguez discharge to home/self care.                   Follow-up Information    None         Patient's Medications   Discharge Prescriptions    ALBUTEROL (2.5 MG/3 ML) 0.083 % NEBULIZER SOLUTION    Take 3 mL (2.5 mg total) by nebulization every 6 (six) hours as needed for wheezing or shortness of breath       Start Date: 5/6/2024  End Date: --       Order Dose: 2.5 mg       Quantity: 75 mL    Refills: 0    ALBUTEROL (PROVENTIL HFA,VENTOLIN HFA) 90 MCG/ACT INHALER    Inhale 2 puffs every 6 (six) hours as needed for wheezing or shortness of breath for up to 14 days       Start Date: 5/6/2024  End Date: 5/20/2024       Order Dose: 2 puffs       Quantity: 6.7 g    Refills: 0    PREDNISONE 20 MG TABLET    Take 2.5 tablets (50 mg total) by mouth daily for 5 days       Start Date: 5/6/2024  End Date: 5/11/2024       Order Dose: " 50 mg       Quantity: 13 tablet    Refills: 0       No discharge procedures on file.    PDMP Review       None            ED Provider  Electronically Signed by             Ebonie Gipson PA-C  05/06/24 2321

## 2024-05-06 NOTE — DISCHARGE INSTRUCTIONS
Use inhaladores y nebulizadores de albuterol según sea necesario para la dificultad para respirar, opresión en el pecho.  Regrese a la edith de emergencias si no hay mejoría    Speed prednisona aron los próximos 5 días según lo recetado    Speed cetirizina (ZyrTEC) diariamente según lo recetado por brasher médico de cabecera    Regresa por dolor en el pecho, empeoramiento de la dificultad para respirar, hinchazón de las piernas, tos con shola o cualquier otro síntoma nuevo o preocupante    =========  Use albuterol inhaler and nebulizers as needed for shortness of breath, chest tightness.  Return to ER if no improvement    Take prednisone for the next 5 days as prescribed    Take cetirizine (ZyrTEC) daily as prescribed by your family doctor    Return for chest pain, worsening trouble breathing, leg swelling, coughing up blood, or any other new/concerning symptoms

## 2024-05-22 ENCOUNTER — HOSPITAL ENCOUNTER (EMERGENCY)
Facility: HOSPITAL | Age: 34
Discharge: HOME | End: 2024-05-22
Attending: EMERGENCY MEDICINE

## 2024-05-22 ENCOUNTER — APPOINTMENT (EMERGENCY)
Dept: RADIOLOGY | Facility: HOSPITAL | Age: 34
End: 2024-05-22

## 2024-05-22 VITALS
RESPIRATION RATE: 22 BRPM | BODY MASS INDEX: 29.42 KG/M2 | WEIGHT: 198.63 LBS | HEART RATE: 111 BPM | HEIGHT: 69 IN | SYSTOLIC BLOOD PRESSURE: 129 MMHG | TEMPERATURE: 98.5 F | DIASTOLIC BLOOD PRESSURE: 72 MMHG | OXYGEN SATURATION: 98 %

## 2024-05-22 DIAGNOSIS — J45.901 ASTHMA EXACERBATION: Primary | ICD-10-CM

## 2024-05-22 DIAGNOSIS — R06.00 DYSPNEA: ICD-10-CM

## 2024-05-22 PROCEDURE — 99284 EMERGENCY DEPT VISIT MOD MDM: CPT | Performed by: EMERGENCY MEDICINE

## 2024-05-22 PROCEDURE — 71046 X-RAY EXAM CHEST 2 VIEWS: CPT

## 2024-05-22 PROCEDURE — 99283 EMERGENCY DEPT VISIT LOW MDM: CPT

## 2024-05-22 PROCEDURE — 94644 CONT INHLJ TX 1ST HOUR: CPT

## 2024-05-22 PROCEDURE — 96365 THER/PROPH/DIAG IV INF INIT: CPT

## 2024-05-22 PROCEDURE — 94760 N-INVAS EAR/PLS OXIMETRY 1: CPT

## 2024-05-22 PROCEDURE — 96375 TX/PRO/DX INJ NEW DRUG ADDON: CPT

## 2024-05-22 PROCEDURE — 94640 AIRWAY INHALATION TREATMENT: CPT

## 2024-05-22 PROCEDURE — 94664 DEMO&/EVAL PT USE INHALER: CPT

## 2024-05-22 RX ORDER — METHYLPREDNISOLONE SODIUM SUCCINATE 125 MG/2ML
125 INJECTION, POWDER, LYOPHILIZED, FOR SOLUTION INTRAMUSCULAR; INTRAVENOUS ONCE
Status: COMPLETED | OUTPATIENT
Start: 2024-05-22 | End: 2024-05-22

## 2024-05-22 RX ORDER — ALBUTEROL SULFATE 2.5 MG/3ML
2.5 SOLUTION RESPIRATORY (INHALATION) EVERY 6 HOURS PRN
Qty: 75 ML | Refills: 0 | Status: SHIPPED | OUTPATIENT
Start: 2024-05-22

## 2024-05-22 RX ORDER — ALBUTEROL SULFATE 2.5 MG/3ML
2.5 SOLUTION RESPIRATORY (INHALATION) EVERY 6 HOURS PRN
Qty: 75 ML | Refills: 0 | Status: SHIPPED | OUTPATIENT
Start: 2024-05-22 | End: 2024-05-22

## 2024-05-22 RX ORDER — SODIUM CHLORIDE FOR INHALATION 0.9 %
12 VIAL, NEBULIZER (ML) INHALATION ONCE
Status: COMPLETED | OUTPATIENT
Start: 2024-05-22 | End: 2024-05-22

## 2024-05-22 RX ORDER — ALBUTEROL SULFATE 90 UG/1
1-2 AEROSOL, METERED RESPIRATORY (INHALATION) EVERY 6 HOURS PRN
Qty: 8 G | Refills: 0 | Status: SHIPPED | OUTPATIENT
Start: 2024-05-22

## 2024-05-22 RX ORDER — ALBUTEROL SULFATE 90 UG/1
2 AEROSOL, METERED RESPIRATORY (INHALATION) EVERY 6 HOURS PRN
Qty: 6.7 G | Refills: 0 | Status: SHIPPED | OUTPATIENT
Start: 2024-05-22 | End: 2024-05-22

## 2024-05-22 RX ORDER — MAGNESIUM SULFATE HEPTAHYDRATE 40 MG/ML
2 INJECTION, SOLUTION INTRAVENOUS ONCE
Status: COMPLETED | OUTPATIENT
Start: 2024-05-22 | End: 2024-05-22

## 2024-05-22 RX ORDER — PREDNISONE 20 MG/1
40 TABLET ORAL DAILY
Qty: 10 TABLET | Refills: 0 | Status: SHIPPED | OUTPATIENT
Start: 2024-05-23 | End: 2024-05-28

## 2024-05-22 RX ADMIN — ALBUTEROL SULFATE 10 MG: 2.5 SOLUTION RESPIRATORY (INHALATION) at 18:40

## 2024-05-22 RX ADMIN — ISODIUM CHLORIDE 12 ML: 0.03 SOLUTION RESPIRATORY (INHALATION) at 18:40

## 2024-05-22 RX ADMIN — IPRATROPIUM BROMIDE 1 MG: 0.5 SOLUTION RESPIRATORY (INHALATION) at 18:40

## 2024-05-22 RX ADMIN — METHYLPREDNISOLONE SODIUM SUCCINATE 125 MG: 125 INJECTION, POWDER, FOR SOLUTION INTRAMUSCULAR; INTRAVENOUS at 18:45

## 2024-05-22 RX ADMIN — MAGNESIUM SULFATE HEPTAHYDRATE 2 G: 40 INJECTION, SOLUTION INTRAVENOUS at 18:47

## 2024-05-22 NOTE — ED PROVIDER NOTES
"History  Chief Complaint   Patient presents with    Asthma     Pt reports worsening SOB and asthma for past 2 days. Pt used albuterol inhaler with little relief. Pt has cough, labored breathing and audible wheezing in triage. Pt seen 5/6 for same complaint.      Patient is a 33-year-old male with a past medical history including asthma. Presents today for a chief complaints of asthma exacerbation and shortness of breath.  Patient states that he has been feeling short of breath for the last 2 days. Has been using his albuterol inhaler without any relief. According to his significant other, patient has been using the inhaler \"a lot of times.\" Denies any precipitating factors including smoking or vaping. Reports some chest tightness along with a productive cough with thick white sputum. Denies any fever, chills, chest pain, or abdominal pain. No recent illness or illness exposure. Patient has been seen in the ED earlier this month and one time last month for similar complaint. Has never required intubation or hospitalization. Patient has not followed up with pulmonology. Patient requesting a refill of his inhaler and nebulizer machine as the one that he has at home is not good.      Asthma  Associated symptoms: cough, shortness of breath and vomiting    Associated symptoms: no abdominal pain, no chest pain, no fever and no nausea        Prior to Admission Medications   Prescriptions Last Dose Informant Patient Reported? Taking?   albuterol (2.5 mg/3 mL) 0.083 % nebulizer solution   No No   Sig: Take 3 mL (2.5 mg total) by nebulization every 6 (six) hours as needed for wheezing or shortness of breath   albuterol (Proventil HFA) 90 mcg/act inhaler   No No   Sig: Inhale 2 puffs every 6 (six) hours as needed for wheezing   albuterol (Ventolin HFA) 90 mcg/act inhaler   No No   Sig: Inhale 2 puffs every 6 (six) hours as needed for wheezing      Facility-Administered Medications: None       Past Medical History:   Diagnosis " Date    Asthma        History reviewed. No pertinent surgical history.    History reviewed. No pertinent family history.  I have reviewed and agree with the history as documented.    E-Cigarette/Vaping     E-Cigarette/Vaping Substances     Social History     Tobacco Use    Smoking status: Never    Smokeless tobacco: Never   Substance Use Topics    Alcohol use: Not Currently     Comment: Occ    Drug use: Never       Review of Systems   Constitutional:  Negative for chills and fever.   Respiratory:  Positive for cough, chest tightness and shortness of breath.    Cardiovascular:  Positive for palpitations. Negative for chest pain.   Gastrointestinal:  Positive for vomiting. Negative for abdominal pain and nausea.   Musculoskeletal:  Positive for back pain.       Physical Exam  Physical Exam  Vitals and nursing note reviewed.   HENT:      Head: Normocephalic and atraumatic.   Cardiovascular:      Rate and Rhythm: Regular rhythm. Tachycardia present.      Heart sounds: Normal heart sounds.   Pulmonary:      Effort: Tachypnea present.      Breath sounds: Wheezing present.      Comments: Inspiratory and expiratory wheezing noted in all lung fields.  Musculoskeletal:         General: Normal range of motion.   Skin:     General: Skin is warm and dry.      Capillary Refill: Capillary refill takes less than 2 seconds.   Neurological:      General: No focal deficit present.      Mental Status: He is alert and oriented to person, place, and time.   Psychiatric:         Mood and Affect: Mood normal.         Behavior: Behavior normal.         Vital Signs  ED Triage Vitals [05/22/24 1742]   Temperature Pulse Respirations Blood Pressure SpO2   98.5 °F (36.9 °C) (!) 139 22 147/60 92 %      Temp Source Heart Rate Source Patient Position - Orthostatic VS BP Location FiO2 (%)   Oral Monitor Sitting Right arm --      Pain Score       --           Vitals:    05/22/24 1742 05/22/24 1849 05/22/24 2025   BP: 147/60 120/75 129/72   Pulse: (!)  139 (!) 128 (!) 111   Patient Position - Orthostatic VS: Sitting Sitting Sitting         Visual Acuity      ED Medications  Medications   magnesium sulfate 2 g/50 mL IVPB (premix) 2 g (0 g Intravenous Stopped 5/22/24 1931)   methylPREDNISolone sodium succinate (Solu-MEDROL) injection 125 mg (125 mg Intravenous Given 5/22/24 1845)   albuterol inhalation solution 10 mg (10 mg Nebulization Given 5/22/24 1840)   ipratropium (ATROVENT) 0.02 % inhalation solution 1 mg (1 mg Nebulization Given 5/22/24 1840)   sodium chloride 0.9 % inhalation solution 12 mL (12 mL Nebulization Given 5/22/24 1840)       Diagnostic Studies  Results Reviewed       None                   XR chest 2 views   ED Interpretation by Tommy Estrada PA-C (05/22 2032)   No acute cardiopulmonary disease on my initial chest x-ray read        Final Result by Caleb Duggan MD (05/23 1011)      No acute cardiopulmonary disease.            Workstation performed: [id number]                    Procedures  Procedures         ED Course                               SBIRT 22yo+      Flowsheet Row Most Recent Value   Initial Alcohol Screen: US AUDIT-C     1. How often do you have a drink containing alcohol? 0 Filed at: 05/22/2024 1743   2. How many drinks containing alcohol do you have on a typical day you are drinking?  0 Filed at: 05/22/2024 1743   3a. Male UNDER 65: How often do you have five or more drinks on one occasion? 0 Filed at: 05/22/2024 1743   Audit-C Score 0 Filed at: 05/22/2024 1743   KIRAN: How many times in the past year have you...    Used an illegal drug or used a prescription medication for non-medical reasons? Never Filed at: 05/22/2024 1743                      Medical Decision Making  Patient is a 33-year-old male presenting for evaluation of shortness of breath. Differentials include but are not limited to: asthma exacerbation, upper respiratory infection, bronchitis, and other viral syndromes. Pneumothorax and pneumonia  considered but are less suspicious given patient's vitals and clinical presentation.    Discussed with patient treatment plan to include:  HEART nebulizer  Chest x-ray  IV steroid  IV magnesium     Discharge vs admission pending patient's response to breathing treatment. X-ray still needs to be completed. Patient is still receiving nebulizer treatment. Sign-out given to SERGIO Estrada PA-C at 1900 who will assume care of patient at this time.     Amount and/or Complexity of Data Reviewed  Radiology: ordered and independent interpretation performed.    Risk  Prescription drug management.             Disposition  Final diagnoses:   Asthma exacerbation     Time reflects when diagnosis was documented in both MDM as applicable and the Disposition within this note       Time User Action Codes Description Comment    5/22/2024  6:57 PM Abril Cesar Add [J45.901] Asthma exacerbation     5/22/2024  6:57 PM Abril Cesar [R06.00] Dyspnea     5/22/2024  8:39 PM Tommy sEtrada Modify [J45.901] Asthma exacerbation     5/22/2024  8:40 PM Tommy Estrada Modify [J45.901] Asthma exacerbation           ED Disposition       ED Disposition   Discharge    Condition   Stable    Date/Time   Wed May 22, 2024 2039    Comment   Evelin Christianson discharge to home/self care.                   Follow-up Information       Follow up With Specialties Details Why Contact Info Additional Information    [facility] Emergency Department Emergency Medicine  If symptoms worsen [address] [facility] [address]  [phone] [facility] Emergency Department, [address] [place], [place], [address]            Discharge Medication List as of 5/22/2024  8:43 PM        START taking these medications    Details   predniSONE 20 mg tablet Take 2 tablets (40 mg total) by mouth daily for 5 days Do not start before May 23, 2024., Starting Thu 5/23/2024, Until Tue 5/28/2024, Normal            CONTINUE these medications which have CHANGED    Details   albuterol (2.5 mg/3 mL) 0.083 % nebulizer solution Take 3 mL (2.5 mg total) by nebulization every 6 (six) hours as needed for wheezing or shortness of breath, Starting Wed 5/22/2024, Normal      !! albuterol (Proventil HFA) 90 mcg/act inhaler Inhale 1-2 puffs every 6 (six) hours as needed for wheezing or shortness of breath, Starting Wed 5/22/2024, Normal       !! - Potential duplicate medications found. Please discuss with provider.        CONTINUE these medications which have NOT CHANGED    Details   !! albuterol (Ventolin HFA) 90 mcg/act inhaler Inhale 2 puffs every 6 (six) hours as needed for wheezing, Starting Wed 4/24/2024, Normal       !! - Potential duplicate medications found. Please discuss with provider.          No discharge procedures on file.    PDMP Review       None            ED Provider  Electronically Signed by             CONNIE Pugh  05/23/24 4684

## 2024-05-22 NOTE — ED ATTENDING ATTESTATION
5/22/2024  I, Paty Tucker DO, saw and evaluated the patient. I have discussed the patient with the resident/non-physician practitioner and agree with the resident's/non-physician practitioner's findings, Plan of Care, and MDM as documented in the resident's/non-physician practitioner's note, except where noted. All available labs and Radiology studies were reviewed.  I was present for key portions of any procedure(s) performed by the resident/non-physician practitioner and I was immediately available to provide assistance.       At this point I agree with the current assessment done in the Emergency Department.  I have conducted an independent evaluation of this patient a history and physical is as follows:    ED Course         Critical Care Time  Procedures    33-year-old male with history of asthma with no prior hospital admissions presents to the emergency department with asthma exacerbation over the last 2 days getting worse today.  Patient believes it secondary to seasonal allergies.  He has had a cough productive of white sputum but no URI symptoms or fevers.  He used his albuterol inhaler several times today without relief.  He has been seen earlier in the month for asthma exacerbation as well as in April.  He does not follow-up with a pulmonologist.  On exam he is alert with frequent dry cough and some mild conversational dyspnea but no respiratory distress.  He does have diffuse inspiratory and expiratory wheezes without accessory muscle usage.  Will try heart neb, give IV Solu-Medrol and magnesium.  Will reassess.  If patient is not markedly improved, he will require admission for asthma exacerbation

## 2024-05-22 NOTE — ED CARE HANDOFF
Emergency Department Sign Out Note        Sign out and transfer of care from Abril Cesar. See Separate Emergency Department note.     The patient, Evelin Christianson, was evaluated by the previous provider for asthma exacerbation.    Workup Completed:  XR chest 2 views   ED Interpretation   No acute cardiopulmonary disease on my initial chest x-ray read              ED Course / Workup Pending (followup):  -Pt received hour long neb, mag in process at time of my sign on  -Dispo pending clinical improvement                                     Procedures  Medical Decision Making  Pt had improvement in symptoms with ED therapy, improved with santos neb/corticosteroids here. Resting comfortably on re-exam with normal work of breathing. Slight residual wheezing, lungs otherwise clear. Pt feels comfortable with discharge. Will send with prednisone course, albuterol PRN. F/u and return indications reviewed.     Amount and/or Complexity of Data Reviewed  Radiology: ordered and independent interpretation performed.    Risk  Prescription drug management.            Disposition  Final diagnoses:   Asthma exacerbation     Time reflects when diagnosis was documented in both MDM as applicable and the Disposition within this note       Time User Action Codes Description Comment    5/22/2024  6:57 PM Abril Cesar Add [J45.901] Asthma exacerbation     5/22/2024  6:57 PM Abril Cesar Add [R06.00] Dyspnea     5/22/2024  8:39 PM Tommy Estrada Modify [J45.901] Asthma exacerbation     5/22/2024  8:40 PM Tommy Estrada Modify [J45.901] Asthma exacerbation           ED Disposition       ED Disposition   Discharge    Condition   Stable    Date/Time   Wed May 22, 2024  8:39 PM    Comment   Evelin Christianson discharge to home/self care.                   Follow-up Information       Follow up With Specialties Details Why Contact Info Additional Information    [facility] Emergency Department Emergency  Medicine  If symptoms worsen [address] [facility] [address]  [phone] [facility] Emergency Department, [address] [place], [place], [address]          Discharge Medication List as of 5/22/2024  8:43 PM        START taking these medications    Details   predniSONE 20 mg tablet Take 2 tablets (40 mg total) by mouth daily for 5 days Do not start before May 23, 2024., Starting Thu 5/23/2024, Until Tue 5/28/2024, Normal           CONTINUE these medications which have CHANGED    Details   albuterol (2.5 mg/3 mL) 0.083 % nebulizer solution Take 3 mL (2.5 mg total) by nebulization every 6 (six) hours as needed for wheezing or shortness of breath, Starting Wed 5/22/2024, Normal      !! albuterol (Proventil HFA) 90 mcg/act inhaler Inhale 1-2 puffs every 6 (six) hours as needed for wheezing or shortness of breath, Starting Wed 5/22/2024, Normal       !! - Potential duplicate medications found. Please discuss with provider.        CONTINUE these medications which have NOT CHANGED    Details   !! albuterol (Ventolin HFA) 90 mcg/act inhaler Inhale 2 puffs every 6 (six) hours as needed for wheezing, Starting Wed 4/24/2024, Normal       !! - Potential duplicate medications found. Please discuss with provider.        No discharge procedures on file.       ED Provider  Electronically Signed by     Tommy Estrada PA-C  05/22/24 2052

## 2024-05-23 NOTE — DISCHARGE INSTRUCTIONS
Please refer to the attached information for strict return instructions. If symptoms worsen or new symptoms develop please return to the ER. Please follow up with your primary care physician.

## 2024-06-29 ENCOUNTER — HOSPITAL ENCOUNTER (EMERGENCY)
Facility: HOSPITAL | Age: 34
Discharge: HOME | End: 2024-06-29
Attending: EMERGENCY MEDICINE

## 2024-06-29 VITALS
HEART RATE: 110 BPM | TEMPERATURE: 98.6 F | WEIGHT: 198.63 LBS | RESPIRATION RATE: 20 BRPM | HEIGHT: 69 IN | BODY MASS INDEX: 29.42 KG/M2 | DIASTOLIC BLOOD PRESSURE: 80 MMHG | SYSTOLIC BLOOD PRESSURE: 128 MMHG | OXYGEN SATURATION: 97 %

## 2024-06-29 DIAGNOSIS — R21 RASH: ICD-10-CM

## 2024-06-29 DIAGNOSIS — J45.901 ASTHMA EXACERBATION: Primary | ICD-10-CM

## 2024-06-29 PROCEDURE — 94640 AIRWAY INHALATION TREATMENT: CPT

## 2024-06-29 PROCEDURE — 96365 THER/PROPH/DIAG IV INF INIT: CPT

## 2024-06-29 PROCEDURE — 94664 DEMO&/EVAL PT USE INHALER: CPT

## 2024-06-29 PROCEDURE — 96375 TX/PRO/DX INJ NEW DRUG ADDON: CPT

## 2024-06-29 PROCEDURE — 94760 N-INVAS EAR/PLS OXIMETRY 1: CPT

## 2024-06-29 PROCEDURE — 99283 EMERGENCY DEPT VISIT LOW MDM: CPT

## 2024-06-29 PROCEDURE — 94644 CONT INHLJ TX 1ST HOUR: CPT

## 2024-06-29 PROCEDURE — 99291 CRITICAL CARE FIRST HOUR: CPT | Performed by: EMERGENCY MEDICINE

## 2024-06-29 RX ORDER — FLUTICASONE PROPIONATE 0.05 %
CREAM (GRAM) TOPICAL 2 TIMES DAILY
Qty: 30 G | Refills: 0 | Status: SHIPPED | OUTPATIENT
Start: 2024-06-29

## 2024-06-29 RX ORDER — MAGNESIUM SULFATE HEPTAHYDRATE 40 MG/ML
2 INJECTION, SOLUTION INTRAVENOUS ONCE
Status: COMPLETED | OUTPATIENT
Start: 2024-06-29 | End: 2024-06-29

## 2024-06-29 RX ORDER — BUDESONIDE AND FORMOTEROL FUMARATE DIHYDRATE 160; 4.5 UG/1; UG/1
2 AEROSOL RESPIRATORY (INHALATION) ONCE
Status: COMPLETED | OUTPATIENT
Start: 2024-06-29 | End: 2024-06-29

## 2024-06-29 RX ORDER — ALBUTEROL SULFATE 2.5 MG/3ML
2.5 SOLUTION RESPIRATORY (INHALATION) EVERY 6 HOURS PRN
Qty: 75 ML | Refills: 0 | Status: SHIPPED | OUTPATIENT
Start: 2024-06-29

## 2024-06-29 RX ORDER — SODIUM CHLORIDE FOR INHALATION 0.9 %
12 VIAL, NEBULIZER (ML) INHALATION ONCE
Status: COMPLETED | OUTPATIENT
Start: 2024-06-29 | End: 2024-06-29

## 2024-06-29 RX ORDER — METHYLPREDNISOLONE SODIUM SUCCINATE 125 MG/2ML
125 INJECTION, POWDER, LYOPHILIZED, FOR SOLUTION INTRAMUSCULAR; INTRAVENOUS ONCE
Status: COMPLETED | OUTPATIENT
Start: 2024-06-29 | End: 2024-06-29

## 2024-06-29 RX ORDER — PREDNISONE 20 MG/1
60 TABLET ORAL DAILY
Qty: 15 TABLET | Refills: 0 | Status: SHIPPED | OUTPATIENT
Start: 2024-06-29

## 2024-06-29 RX ORDER — BUDESONIDE AND FORMOTEROL FUMARATE DIHYDRATE 160; 4.5 UG/1; UG/1
2 AEROSOL RESPIRATORY (INHALATION) 2 TIMES DAILY
Status: DISCONTINUED | OUTPATIENT
Start: 2024-06-29 | End: 2024-06-29

## 2024-06-29 RX ADMIN — BUDESONIDE AND FORMOTEROL FUMARATE DIHYDRATE 2 PUFF: 160; 4.5 AEROSOL RESPIRATORY (INHALATION) at 16:39

## 2024-06-29 RX ADMIN — IPRATROPIUM BROMIDE 1 MG: 0.5 SOLUTION RESPIRATORY (INHALATION) at 14:57

## 2024-06-29 RX ADMIN — ALBUTEROL SULFATE 10 MG: 2.5 SOLUTION RESPIRATORY (INHALATION) at 14:57

## 2024-06-29 RX ADMIN — ISODIUM CHLORIDE 12 ML: 0.03 SOLUTION RESPIRATORY (INHALATION) at 14:57

## 2024-06-29 RX ADMIN — MAGNESIUM SULFATE HEPTAHYDRATE 2 G: 40 INJECTION, SOLUTION INTRAVENOUS at 15:03

## 2024-06-29 RX ADMIN — METHYLPREDNISOLONE SODIUM SUCCINATE 125 MG: 125 INJECTION, POWDER, FOR SOLUTION INTRAMUSCULAR; INTRAVENOUS at 15:03

## 2024-06-29 NOTE — ED PROVIDER NOTES
History  Chief Complaint   Patient presents with    Asthma     Patient reports asthma exacerbation, took inhaler without relief.     32 YO male with history of asthma presents with increasing shortness of breath. Significant other provides history, she states he has had a cough and increased shortness of breath for the last 3 days. He has been using his inhaler without relief and he does not have nebulizer fluid currently. His dyspnea worsened today. Patient has had similar in the past with multiple ED presentations for evaluation. He denies new or different symptoms during this episode. Pt denies CP/F/C/N/V/D/C, no dysuria, burning on urination or blood in urine.       History provided by:  Patient   used: No        Prior to Admission Medications   Prescriptions Last Dose Informant Patient Reported? Taking?   albuterol (2.5 mg/3 mL) 0.083 % nebulizer solution   No No   Sig: Take 3 mL (2.5 mg total) by nebulization every 6 (six) hours as needed for wheezing or shortness of breath   albuterol (PROVENTIL HFA,VENTOLIN HFA) 90 mcg/act inhaler   Yes No   Sig: Inhale 2 puffs every 6 (six) hours as needed for wheezing   albuterol (Proventil HFA) 90 mcg/act inhaler   No No   Sig: Inhale 2 puffs every 6 (six) hours as needed for wheezing   albuterol (Proventil HFA) 90 mcg/act inhaler   No No   Sig: Inhale 1-2 puffs every 6 (six) hours as needed for wheezing or shortness of breath   albuterol (Proventil HFA) 90 mcg/act inhaler   No No   Sig: Inhale 1-2 puffs every 6 (six) hours as needed for wheezing or shortness of breath   albuterol (Ventolin HFA) 90 mcg/act inhaler   No No   Sig: Inhale 2 puffs every 6 (six) hours as needed for wheezing   dextromethorphan-guaifenesin (MUCINEX DM)  MG per 12 hr tablet   No No   Sig: Take 1 tablet by mouth every 12 (twelve) hours as needed for cough   predniSONE 10 mg tablet   No No   Si tabs daily for 4 days then, 4 tabs daily for 2 days then, 2 tabs daily for  2 days then, 1 tab daily 2 days      Facility-Administered Medications: None       Past Medical History:   Diagnosis Date    Asthma        History reviewed. No pertinent surgical history.    History reviewed. No pertinent family history.  I have reviewed and agree with the history as documented.    E-Cigarette/Vaping     E-Cigarette/Vaping Substances     Social History     Tobacco Use    Smoking status: Never    Smokeless tobacco: Never   Substance Use Topics    Alcohol use: Not Currently     Comment: Occ    Drug use: Never       Review of Systems   Constitutional:  Negative for fever.   HENT:  Negative for dental problem.    Eyes:  Negative for visual disturbance.   Respiratory:  Positive for cough, shortness of breath and wheezing.    Cardiovascular:  Negative for chest pain.   Gastrointestinal:  Negative for abdominal pain, nausea and vomiting.   Genitourinary:  Negative for dysuria and frequency.   Musculoskeletal:  Negative for neck pain and neck stiffness.   Skin:  Negative for rash.   Neurological:  Negative for dizziness, weakness and light-headedness.   Psychiatric/Behavioral:  Negative for agitation, behavioral problems and confusion.    All other systems reviewed and are negative.      Physical Exam  Physical Exam  Vitals and nursing note reviewed.   Constitutional:       Appearance: He is well-developed.   HENT:      Head: Normocephalic and atraumatic.   Eyes:      Extraocular Movements: Extraocular movements intact.   Cardiovascular:      Rate and Rhythm: Tachycardia present.   Pulmonary:      Effort: Respiratory distress present.      Breath sounds: No wheezing.      Comments: Tachypnea with increased work of breathing, tripoding.  Abdominal:      General: There is no distension.   Musculoskeletal:         General: Normal range of motion.      Cervical back: Normal range of motion.   Skin:     Findings: No rash.   Neurological:      Mental Status: He is alert and oriented to person, place, and time.    Psychiatric:         Behavior: Behavior normal.         Vital Signs  ED Triage Vitals   Temperature Pulse Respirations Blood Pressure SpO2   06/29/24 1654 06/29/24 1437 06/29/24 1437 06/29/24 1437 06/29/24 1437   98.6 °F (37 °C) (!) 135 (!) 24 153/97 92 %      Temp Source Heart Rate Source Patient Position - Orthostatic VS BP Location FiO2 (%)   06/29/24 1654 06/29/24 1437 06/29/24 1437 06/29/24 1437 --   Oral Monitor Sitting Right arm       Pain Score       --                  Vitals:    06/29/24 1437 06/29/24 1637   BP: 153/97 128/80   Pulse: (!) 135 (!) 110   Patient Position - Orthostatic VS: Sitting Lying         Visual Acuity      ED Medications  Medications   albuterol inhalation solution 10 mg (10 mg Nebulization Given 6/29/24 1457)   ipratropium (ATROVENT) 0.02 % inhalation solution 1 mg (1 mg Nebulization Given 6/29/24 1457)   sodium chloride 0.9 % inhalation solution 12 mL (12 mL Nebulization Given 6/29/24 1457)   magnesium sulfate 2 g/50 mL IVPB (premix) 2 g (0 g Intravenous Stopped 6/29/24 1559)   methylPREDNISolone sodium succinate (Solu-MEDROL) injection 125 mg (125 mg Intravenous Given 6/29/24 1503)   budesonide-formoterol (SYMBICORT) 160-4.5 mcg/act inhaler 2 puff (2 puffs Inhalation Given 6/29/24 1639)       Diagnostic Studies  Results Reviewed       None                   No orders to display              Procedures  CriticalCare Time    Date/Time: 6/29/2024 4:00 PM    Performed by: Eldon Cardoza MD  Authorized by: Eldon Cardoza MD    Critical care provider statement:     Critical care time (minutes):  35    Critical care time was exclusive of:  Separately billable procedures and treating other patients and teaching time    Critical care was necessary to treat or prevent imminent or life-threatening deterioration of the following conditions:  Respiratory failure    Critical care was time spent personally by me on the following activities:  Blood draw for specimens, obtaining history  from patient or surrogate, development of treatment plan with patient or surrogate, discussions with consultants, evaluation of patient's response to treatment, examination of patient, interpretation of cardiac output measurements, ordering and performing treatments and interventions, ordering and review of laboratory studies, ordering and review of radiographic studies, re-evaluation of patient's condition and review of old charts  Comments:      Significant work of breathing with respiratory distress requiring supplemental oxygen and ZAMBRANO neb.           ED Course  ED Course as of 06/30/24 0722   Sat Jun 29, 2024   1524 Patient sitting, appears improved, decreased work of breathing.                                             Medical Decision Making  1. Shortness of breath - Patient with Hx of asthma, states he is taking his inhaler without relief. Will give ZAMBRANO neb, steroids Solumedrol.    Risk  Prescription drug management.             Disposition  Final diagnoses:   Asthma exacerbation   Rash     Time reflects when diagnosis was documented in both MDM as applicable and the Disposition within this note       Time User Action Codes Description Comment    6/29/2024  4:50 PM Eldon Cardoza [J45.901] Asthma exacerbation     6/29/2024  4:50 PM Eldon Cardoza [R21] Rash           ED Disposition       ED Disposition   Discharge    Condition   Stable    Date/Time   Sat Jun 29, 2024  4:50 PM    Comment   Evelin Christianson discharge to home/self care.                   Follow-up Information       Follow up With Specialties Details Why Contact Andre Rivera Ma, DO Family Medicine Schedule an appointment as soon as possible for a visit   [address] [place] [address]  [phone]              Discharge Medication List as of 6/29/2024  4:54 PM        START taking these medications    Details   !! albuterol (2.5 mg/3 mL) 0.083 % nebulizer solution Take 3 mL (2.5 mg total) by nebulization every 6 (six) hours  as needed for wheezing or shortness of breath, Starting Sat 6/29/2024, Normal      fluticasone (CUTIVATE) 0.05 % cream Apply topically 2 (two) times a day, Starting Sat 6/29/2024, Normal      !! predniSONE 20 mg tablet Take 3 tablets (60 mg total) by mouth daily, Starting Sat 6/29/2024, Normal       !! - Potential duplicate medications found. Please discuss with provider.        CONTINUE these medications which have NOT CHANGED    Details   !! albuterol (2.5 mg/3 mL) 0.083 % nebulizer solution Take 3 mL (2.5 mg total) by nebulization every 6 (six) hours as needed for wheezing or shortness of breath, Starting Wed 5/22/2024, Normal      !! albuterol (Proventil HFA) 90 mcg/act inhaler Inhale 2 puffs every 6 (six) hours as needed for wheezing, Starting Tue 11/28/2023, Normal      !! albuterol (Proventil HFA) 90 mcg/act inhaler Inhale 1-2 puffs every 6 (six) hours as needed for wheezing or shortness of breath, Starting Wed 12/20/2023, Normal      !! albuterol (Proventil HFA) 90 mcg/act inhaler Inhale 1-2 puffs every 6 (six) hours as needed for wheezing or shortness of breath, Starting Wed 5/22/2024, Normal      !! albuterol (PROVENTIL HFA,VENTOLIN HFA) 90 mcg/act inhaler Inhale 2 puffs every 6 (six) hours as needed for wheezing, Historical Med      !! albuterol (Ventolin HFA) 90 mcg/act inhaler Inhale 2 puffs every 6 (six) hours as needed for wheezing, Starting Wed 4/24/2024, Normal      dextromethorphan-guaifenesin (MUCINEX DM)  MG per 12 hr tablet Take 1 tablet by mouth every 12 (twelve) hours as needed for cough, Starting Wed 12/20/2023, Normal      !! predniSONE 10 mg tablet 6 tabs daily for 4 days then, 4 tabs daily for 2 days then, 2 tabs daily for 2 days then, 1 tab daily 2 days, Normal       !! - Potential duplicate medications found. Please discuss with provider.          No discharge procedures on file.    PDMP Review       None            ED Provider  Electronically Signed by             Eldon Wagner  MD Nani  06/30/24 0722

## 2024-06-29 NOTE — DISCHARGE INSTRUCTIONS
Take the Prednisone, 3 pills daily for the next 5 days.  Use the albuterol as needed for shortness of breath and wheezing.  Return to the ER if your breathing worsens despite taking the medications.    Use the budesonide-formoterol inhaler twice daily to help prevent further exacerbations.    Use the cream over your rash twice daily.    Call your family doctor, you should be seen in the office for further evaluation and management of your asthma.    [place] prednisona, 3 pastillas diarias santosh los próximos 5 días.     Use albuterol según sea necesario para la dificultad para respirar y las sibilancias. Regrese a la osmani de emergencias si bender respiración empeora a pesar de yaima los medicamentos.     Utilice el inhalador de budesonida-formoterol dos veces al día para ayudar a prevenir nuevas exacerbaciones.     Use la crema sobre el sarpullido dos veces al día.     Llame a bender médico de citlali, debe ser atendido en el consultorio para mehdi evaluación y control adicionales de bender asma.

## 2025-05-09 ENCOUNTER — APPOINTMENT (EMERGENCY)
Dept: RADIOLOGY | Facility: HOSPITAL | Age: 35
End: 2025-05-09

## 2025-05-09 ENCOUNTER — HOSPITAL ENCOUNTER (OUTPATIENT)
Facility: HOSPITAL | Age: 35
Setting detail: OBSERVATION
Discharge: HOME | End: 2025-05-10
Attending: INTERNAL MEDICINE | Admitting: INTERNAL MEDICINE

## 2025-05-09 DIAGNOSIS — J45.901 ASTHMA EXACERBATION: ICD-10-CM

## 2025-05-09 DIAGNOSIS — I10 ESSENTIAL HYPERTENSION: ICD-10-CM

## 2025-05-09 DIAGNOSIS — I31.9 PERICARDITIS: Primary | ICD-10-CM

## 2025-05-09 DIAGNOSIS — J40 BRONCHITIS: ICD-10-CM

## 2025-05-09 DIAGNOSIS — R06.2 WHEEZING: ICD-10-CM

## 2025-05-09 DIAGNOSIS — R00.0 TACHYCARDIA: ICD-10-CM

## 2025-05-09 LAB
ANION GAP SERPL CALCULATED.3IONS-SCNC: 8 MMOL/L (ref 4–13)
ATRIAL RATE: 116 BPM
BASOPHILS # BLD AUTO: 0.06 THOUSANDS/ÂΜL (ref 0–0.1)
BASOPHILS NFR BLD AUTO: 1 % (ref 0–1)
BUN SERPL-MCNC: 8 MG/DL (ref 5–25)
CALCIUM SERPL-MCNC: 9.7 MG/DL (ref 8.4–10.2)
CARDIAC TROPONIN I PNL SERPL HS: <2 NG/L (ref ?–50)
CHLORIDE SERPL-SCNC: 102 MMOL/L (ref 96–108)
CO2 SERPL-SCNC: 27 MMOL/L (ref 21–32)
CREAT SERPL-MCNC: 0.8 MG/DL (ref 0.6–1.3)
CRP SERPL QL: 15.6 MG/L
D DIMER PPP FEU-MCNC: 0.28 UG/ML FEU
EOSINOPHIL # BLD AUTO: 0.73 THOUSAND/ÂΜL (ref 0–0.61)
EOSINOPHIL NFR BLD AUTO: 8 % (ref 0–6)
ERYTHROCYTE [DISTWIDTH] IN BLOOD BY AUTOMATED COUNT: 14.9 % (ref 11.6–15.1)
ERYTHROCYTE [SEDIMENTATION RATE] IN BLOOD: 28 MM/HOUR (ref 0–14)
FLUAV RNA RESP QL NAA+PROBE: NEGATIVE
FLUBV RNA RESP QL NAA+PROBE: NEGATIVE
GFR SERPL CREATININE-BSD FRML MDRD: 118 ML/MIN/1.73SQ M
GLUCOSE SERPL-MCNC: 130 MG/DL (ref 65–140)
HCT VFR BLD AUTO: 45.3 % (ref 36.5–49.3)
HGB BLD-MCNC: 15.1 G/DL (ref 12–17)
IMM GRANULOCYTES # BLD AUTO: 0.03 THOUSAND/UL (ref 0–0.2)
IMM GRANULOCYTES NFR BLD AUTO: 0 % (ref 0–2)
LYMPHOCYTES # BLD AUTO: 2.12 THOUSANDS/ÂΜL (ref 0.6–4.47)
LYMPHOCYTES NFR BLD AUTO: 23 % (ref 14–44)
MCH RBC QN AUTO: 27.4 PG (ref 26.8–34.3)
MCHC RBC AUTO-ENTMCNC: 33.3 G/DL (ref 31.4–37.4)
MCV RBC AUTO: 82 FL (ref 82–98)
MONOCYTES # BLD AUTO: 0.48 THOUSAND/ÂΜL (ref 0.17–1.22)
MONOCYTES NFR BLD AUTO: 5 % (ref 4–12)
NEUTROPHILS # BLD AUTO: 5.77 THOUSANDS/ÂΜL (ref 1.85–7.62)
NEUTS SEG NFR BLD AUTO: 63 % (ref 43–75)
NRBC BLD AUTO-RTO: 0 /100 WBCS
P AXIS: 74 DEGREES
PLATELET # BLD AUTO: 303 THOUSANDS/UL (ref 149–390)
PMV BLD AUTO: 11 FL (ref 8.9–12.7)
POTASSIUM SERPL-SCNC: 3.9 MMOL/L (ref 3.5–5.3)
PR INTERVAL: 162 MS
QRS AXIS: 83 DEGREES
QRSD INTERVAL: 88 MS
QT INTERVAL: 320 MS
QTC INTERVAL: 444 MS
RBC # BLD AUTO: 5.52 MILLION/UL (ref 3.88–5.62)
RSV RNA RESP QL NAA+PROBE: NEGATIVE
SARS-COV-2 RNA RESP QL NAA+PROBE: NEGATIVE
SODIUM SERPL-SCNC: 137 MMOL/L (ref 135–147)
T WAVE AXIS: 65 DEGREES
VENTRICULAR RATE: 116 BPM
WBC # BLD AUTO: 9.19 THOUSAND/UL (ref 4.31–10.16)

## 2025-05-09 PROCEDURE — 94640 AIRWAY INHALATION TREATMENT: CPT

## 2025-05-09 PROCEDURE — 94644 CONT INHLJ TX 1ST HOUR: CPT

## 2025-05-09 PROCEDURE — 99223 1ST HOSP IP/OBS HIGH 75: CPT | Performed by: INTERNAL MEDICINE

## 2025-05-09 PROCEDURE — 85652 RBC SED RATE AUTOMATED: CPT | Performed by: INTERNAL MEDICINE

## 2025-05-09 PROCEDURE — 96365 THER/PROPH/DIAG IV INF INIT: CPT

## 2025-05-09 PROCEDURE — 86140 C-REACTIVE PROTEIN: CPT | Performed by: INTERNAL MEDICINE

## 2025-05-09 PROCEDURE — 0241U HB NFCT DS VIR RESP RNA 4 TRGT: CPT | Performed by: INTERNAL MEDICINE

## 2025-05-09 PROCEDURE — 71046 X-RAY EXAM CHEST 2 VIEWS: CPT

## 2025-05-09 PROCEDURE — 93005 ELECTROCARDIOGRAM TRACING: CPT

## 2025-05-09 PROCEDURE — 99291 CRITICAL CARE FIRST HOUR: CPT

## 2025-05-09 PROCEDURE — 99244 OFF/OP CNSLTJ NEW/EST MOD 40: CPT | Performed by: INTERNAL MEDICINE

## 2025-05-09 PROCEDURE — 94664 DEMO&/EVAL PT USE INHALER: CPT

## 2025-05-09 PROCEDURE — 96361 HYDRATE IV INFUSION ADD-ON: CPT

## 2025-05-09 PROCEDURE — 36415 COLL VENOUS BLD VENIPUNCTURE: CPT

## 2025-05-09 PROCEDURE — 94760 N-INVAS EAR/PLS OXIMETRY 1: CPT

## 2025-05-09 PROCEDURE — 85379 FIBRIN DEGRADATION QUANT: CPT | Performed by: PHYSICIAN ASSISTANT

## 2025-05-09 PROCEDURE — 84484 ASSAY OF TROPONIN QUANT: CPT | Performed by: PHYSICIAN ASSISTANT

## 2025-05-09 PROCEDURE — 96375 TX/PRO/DX INJ NEW DRUG ADDON: CPT

## 2025-05-09 PROCEDURE — 80048 BASIC METABOLIC PNL TOTAL CA: CPT

## 2025-05-09 PROCEDURE — 93010 ELECTROCARDIOGRAM REPORT: CPT | Performed by: INTERNAL MEDICINE

## 2025-05-09 PROCEDURE — 85025 COMPLETE CBC W/AUTO DIFF WBC: CPT

## 2025-05-09 PROCEDURE — 99285 EMERGENCY DEPT VISIT HI MDM: CPT

## 2025-05-09 RX ORDER — MAGNESIUM HYDROXIDE/ALUMINUM HYDROXICE/SIMETHICONE 120; 1200; 1200 MG/30ML; MG/30ML; MG/30ML
30 SUSPENSION ORAL EVERY 6 HOURS PRN
Status: DISCONTINUED | OUTPATIENT
Start: 2025-05-09 | End: 2025-05-10 | Stop reason: HOSPADM

## 2025-05-09 RX ORDER — FORMOTEROL FUMARATE 20 UG/2ML
20 SOLUTION RESPIRATORY (INHALATION)
Status: DISCONTINUED | OUTPATIENT
Start: 2025-05-09 | End: 2025-05-10 | Stop reason: HOSPADM

## 2025-05-09 RX ORDER — ACETAMINOPHEN 325 MG/1
650 TABLET ORAL EVERY 6 HOURS PRN
Status: DISCONTINUED | OUTPATIENT
Start: 2025-05-09 | End: 2025-05-10 | Stop reason: HOSPADM

## 2025-05-09 RX ORDER — METHYLPREDNISOLONE SODIUM SUCCINATE 125 MG/2ML
125 INJECTION, POWDER, LYOPHILIZED, FOR SOLUTION INTRAMUSCULAR; INTRAVENOUS ONCE
Status: COMPLETED | OUTPATIENT
Start: 2025-05-09 | End: 2025-05-09

## 2025-05-09 RX ORDER — PANTOPRAZOLE SODIUM 40 MG/1
40 TABLET, DELAYED RELEASE ORAL
Status: DISCONTINUED | OUTPATIENT
Start: 2025-05-10 | End: 2025-05-10 | Stop reason: HOSPADM

## 2025-05-09 RX ORDER — LEVALBUTEROL INHALATION SOLUTION 1.25 MG/3ML
1.25 SOLUTION RESPIRATORY (INHALATION)
Status: DISCONTINUED | OUTPATIENT
Start: 2025-05-09 | End: 2025-05-10 | Stop reason: HOSPADM

## 2025-05-09 RX ORDER — COLCHICINE 0.6 MG/1
0.6 TABLET ORAL 2 TIMES DAILY
Status: DISCONTINUED | OUTPATIENT
Start: 2025-05-09 | End: 2025-05-10

## 2025-05-09 RX ORDER — SODIUM CHLORIDE FOR INHALATION 0.9 %
3 VIAL, NEBULIZER (ML) INHALATION
Status: DISCONTINUED | OUTPATIENT
Start: 2025-05-09 | End: 2025-05-10

## 2025-05-09 RX ORDER — IBUPROFEN 400 MG/1
800 TABLET, FILM COATED ORAL EVERY 8 HOURS SCHEDULED
Status: DISCONTINUED | OUTPATIENT
Start: 2025-05-09 | End: 2025-05-10

## 2025-05-09 RX ORDER — LEVALBUTEROL INHALATION SOLUTION 1.25 MG/3ML
1.25 SOLUTION RESPIRATORY (INHALATION) EVERY 6 HOURS PRN
Status: DISCONTINUED | OUTPATIENT
Start: 2025-05-09 | End: 2025-05-10 | Stop reason: HOSPADM

## 2025-05-09 RX ORDER — CALCIUM CARBONATE 500 MG/1
1000 TABLET, CHEWABLE ORAL DAILY PRN
Status: DISCONTINUED | OUTPATIENT
Start: 2025-05-09 | End: 2025-05-09

## 2025-05-09 RX ORDER — PREDNISONE 20 MG/1
40 TABLET ORAL DAILY
Status: DISCONTINUED | OUTPATIENT
Start: 2025-05-10 | End: 2025-05-10 | Stop reason: HOSPADM

## 2025-05-09 RX ORDER — DOCUSATE SODIUM 100 MG/1
100 CAPSULE, LIQUID FILLED ORAL 2 TIMES DAILY
Status: DISCONTINUED | OUTPATIENT
Start: 2025-05-09 | End: 2025-05-10 | Stop reason: HOSPADM

## 2025-05-09 RX ORDER — ALBUTEROL SULFATE 5 MG/ML
10 SOLUTION RESPIRATORY (INHALATION) ONCE
Status: COMPLETED | OUTPATIENT
Start: 2025-05-09 | End: 2025-05-09

## 2025-05-09 RX ORDER — SODIUM CHLORIDE FOR INHALATION 0.9 %
3 VIAL, NEBULIZER (ML) INHALATION EVERY 6 HOURS PRN
Status: DISCONTINUED | OUTPATIENT
Start: 2025-05-09 | End: 2025-05-10 | Stop reason: HOSPADM

## 2025-05-09 RX ORDER — SIMETHICONE 80 MG
80 TABLET,CHEWABLE ORAL 4 TIMES DAILY PRN
Status: DISCONTINUED | OUTPATIENT
Start: 2025-05-09 | End: 2025-05-10 | Stop reason: HOSPADM

## 2025-05-09 RX ORDER — SODIUM CHLORIDE FOR INHALATION 0.9 %
12 VIAL, NEBULIZER (ML) INHALATION ONCE
Status: COMPLETED | OUTPATIENT
Start: 2025-05-09 | End: 2025-05-09

## 2025-05-09 RX ORDER — ALBUTEROL SULFATE 0.83 MG/ML
5 SOLUTION RESPIRATORY (INHALATION) ONCE
Status: COMPLETED | OUTPATIENT
Start: 2025-05-09 | End: 2025-05-09

## 2025-05-09 RX ORDER — MAGNESIUM SULFATE HEPTAHYDRATE 40 MG/ML
2 INJECTION, SOLUTION INTRAVENOUS ONCE
Status: COMPLETED | OUTPATIENT
Start: 2025-05-09 | End: 2025-05-09

## 2025-05-09 RX ORDER — ALBUTEROL SULFATE 5 MG/ML
5 SOLUTION RESPIRATORY (INHALATION) ONCE
Status: DISCONTINUED | OUTPATIENT
Start: 2025-05-09 | End: 2025-05-09

## 2025-05-09 RX ORDER — PREDNISONE 20 MG/1
40 TABLET ORAL DAILY
Status: DISCONTINUED | OUTPATIENT
Start: 2025-05-10 | End: 2025-05-09

## 2025-05-09 RX ORDER — BUDESONIDE 0.5 MG/2ML
0.5 INHALANT ORAL
Status: DISCONTINUED | OUTPATIENT
Start: 2025-05-09 | End: 2025-05-10 | Stop reason: HOSPADM

## 2025-05-09 RX ORDER — ONDANSETRON 2 MG/ML
4 INJECTION INTRAMUSCULAR; INTRAVENOUS EVERY 6 HOURS PRN
Status: DISCONTINUED | OUTPATIENT
Start: 2025-05-09 | End: 2025-05-10 | Stop reason: HOSPADM

## 2025-05-09 RX ADMIN — IPRATROPIUM BROMIDE 0.5 MG: 0.5 SOLUTION RESPIRATORY (INHALATION) at 20:14

## 2025-05-09 RX ADMIN — MAGNESIUM SULFATE HEPTAHYDRATE 2 G: 40 INJECTION, SOLUTION INTRAVENOUS at 09:53

## 2025-05-09 RX ADMIN — IPRATROPIUM BROMIDE 0.5 MG: 0.5 SOLUTION RESPIRATORY (INHALATION) at 13:49

## 2025-05-09 RX ADMIN — METHYLPREDNISOLONE SODIUM SUCCINATE 125 MG: 125 INJECTION, POWDER, FOR SOLUTION INTRAMUSCULAR; INTRAVENOUS at 09:50

## 2025-05-09 RX ADMIN — ALBUTEROL SULFATE 5 MG: 2.5 SOLUTION RESPIRATORY (INHALATION) at 13:49

## 2025-05-09 RX ADMIN — IBUPROFEN 800 MG: 400 TABLET ORAL at 22:24

## 2025-05-09 RX ADMIN — LEVALBUTEROL HYDROCHLORIDE 1.25 MG: 1.25 SOLUTION RESPIRATORY (INHALATION) at 20:11

## 2025-05-09 RX ADMIN — ALBUTEROL SULFATE 10 MG: 2.5 SOLUTION RESPIRATORY (INHALATION) at 10:04

## 2025-05-09 RX ADMIN — ISODIUM CHLORIDE 12 ML: 0.03 SOLUTION RESPIRATORY (INHALATION) at 10:05

## 2025-05-09 RX ADMIN — COLCHICINE 0.6 MG: 0.6 TABLET ORAL at 17:48

## 2025-05-09 RX ADMIN — SODIUM CHLORIDE 1000 ML: 0.9 INJECTION, SOLUTION INTRAVENOUS at 13:50

## 2025-05-09 RX ADMIN — IBUPROFEN 800 MG: 400 TABLET ORAL at 17:48

## 2025-05-09 RX ADMIN — IPRATROPIUM BROMIDE 1 MG: 0.5 SOLUTION RESPIRATORY (INHALATION) at 10:05

## 2025-05-09 RX ADMIN — FORMOTEROL FUMARATE DIHYDRATE 20 MCG: 20 SOLUTION RESPIRATORY (INHALATION) at 20:14

## 2025-05-09 RX ADMIN — BUDESONIDE 0.5 MG: 0.5 INHALANT RESPIRATORY (INHALATION) at 20:14

## 2025-05-09 NOTE — ED CARE HANDOFF
Emergency Department Sign Out Note        Sign out and transfer of care from Dolly Hsu PA-C. See Separate Emergency Department note.     The patient, Evelin Christianson, was evaluated by the previous provider for shortness of breath.    Per initial provider note - 34 YOM with PMH asthma presents today initially coming in for nasal congestion. However on arrival pt is in respiratory distress, tachypnea, increased work of breathing. Pt reports this has been worsening the past 4 days. Reports that he does have a nebulizer machine at home but ran out of the solution. Denies fever, chills.     Workup Completed:  CBC  BMP  Given Magnesium, Solumedrol and santos neb    ED Course / Workup Pending (followup):  1030 - Per sign out, waiting for medication to be given/finish. Will reassess.    1415 - Patient still complaining of some shortness of breath with ambulation especially. Some wheezing heard on exam. Patient is persistently tachycardic in the 120s despite finishing his santos neb hours ago. He reports chest and back pain. Will add labs and fluids for tachycardia and continue to monitor. Will give another duoneb for wheezing.    1500 D-Dimer, Quant: 0.28    1518 hs TnI 0hr: <2    1659 Message sent to [facility] for admission due to persistent tachycardia. Patient agreeable to this plan.    Patient stable at time of transfer to [facility].      No data recorded                          ED Course as of 05/09/25 1758   Fri May 09, 2025   1415 Patient still complaining of some shortness of breath with ambulation. Some wheezing heard on exam. Patient is persistently tachycardic in the 120s. Will add labs and fluids and continue to monitor. Will give another duoneb.   1500 D-Dimer, Quant: 0.28   1518 hs TnI 0hr: <2   1659 Message sent to [facility]     ECG 12 Lead Documentation Only    Date/Time: 5/9/2025 2:36 PM    Performed by: Collette Hilliard PA-C  Authorized by: Collette Hilliard PA-C    Indications / Diagnosis:  Persistent  tachycardia  ECG reviewed by me, the ED Provider: yes (Also reviewed by attending)    Patient location:  ED  Interpretation:     Interpretation: abnormal    Rate:     ECG rate:  116    ECG rate assessment: tachycardic    Rhythm:     Rhythm: sinus tachycardia    Ectopy:     Ectopy: none    QRS:     QRS intervals:  Normal (88ms)  Conduction:     Conduction: normal    ST segments:     ST segments:  Abnormal  T waves:     T waves: inverted      Inverted:  AVR and V1  Comments:      Possible pericarditis    Medical Decision Making        Problems Addressed:  Asthma exacerbation: acute illness or injury  Tachycardia: acute illness or injury    Amount and/or Complexity of Data Reviewed  Independent Historian:      Details: Patient is historian  Labs: ordered. Decision-making details documented in ED Course.  Radiology: ordered.  ECG/medicine tests: ordered and independent interpretation performed.    Risk  Prescription drug management.  Decision regarding hospitalization.            Disposition  Final diagnoses:   Asthma exacerbation   Tachycardia     Time reflects when diagnosis was documented in both MDM as applicable and the Disposition within this note       Time User Action Codes Description Comment    5/9/2025  5:11 PM Quinten Márquez Add [I31.9] Pericarditis     5/9/2025  5:57 PM Collette Hilliard Add [J45.901] Asthma exacerbation     5/9/2025  5:58 PM Collette Hilliard Add [R00.0] Tachycardia           ED Disposition       ED Disposition   Admit    Condition   Stable    Date/Time   Fri May 9, 2025  5:58 PM    Comment   Case was discussed with Dr. Márquez and the patient's admission status was agreed to be Admission Status: observation status to the service of Dr. Márquez .               Follow-up Information    None       Patient's Medications   Discharge Prescriptions    No medications on file     No discharge procedures on file.       ED Provider  Electronically Signed by     Collette Hilliard PA-C  05/09/25 7172

## 2025-05-09 NOTE — ED ATTENDING ATTESTATION
I supervised the Advanced Practitioner.? I performed, in its entirety, the assessment and plan component of the visit.  I agree with the Advanced Practitioner's note with the following assessment and plan:      35 yo M with CP/SOB/asthma exac  Given santos neb/Mag/Solumedrol, but persistently tachycardic    EKG with diffuse st elevations, concern for pericarditis- troponin ordered to eval for myocarditis  Pt persistently tachycardic, 120s at rest to 150 with getting up.   Admitted for further workup/management

## 2025-05-09 NOTE — CONSULTS
Consultation - Cardiology   Name: Evelin Christianson 34 y.o. male I MRN: [id number]  Unit/Bed#: E4 -01 I Date of Admission: 5/9/2025   Date of Service: 5/9/2025 I Hospital Day: 0   Inpatient consult to Cardiology  Consult performed by: Kamran Grider MD  Consult ordered by: Quinten Márquez DO        Physician Requesting Evaluation: Quinten Márquez DO   Reason for Evaluation / Principal Problem: Chest pain, suspected pericarditis        Date of initial consultation: 05/09/25     Diagnoses:  Chest pain, possible pericarditis  Acute asthma exacerbation  Assessment & Plan  Pericarditis    TODAY'S (05/09/25) ASSESSMENT   HPI / Last 24 hours See HPI section below for details.   Subjective Currently denies chest pain.  Reports some wheezing.  Reports improved shortness of breath.   VITALS highlights Heart rate ranging 112-120s beats per minute, blood pressure 140s to 170s mmHg over 90s to 110s mmHg, oxygen saturation 95 to 96% on room air.   EXAM highlights Large build, slightly obese, in no respiratory distress, has no JVD or carotid bruit, cardiac exam shows tachycardia, normal intensity heart sounds without appreciable murmur.  Bilateral wheezing is noted without any rales.  There is no tenderness to palpation over the chest wall.  There is no pedal edema.   LABS highlights Normal renal function and electrolytes, negative high-sensitivity troponins x 1, normal WBC count, elevated eosinophil count otherwise normal remainder CBC, negative testing for influenza RSV and COVID, CRP elevated at 15.6   ECHO and other studies CXR shows normal cardiomediastinal silhouette without acute cardiopulmonary process.  No recent echocardiogram.   Telemetry & ECG Sinus tachycardia, narrow QRS complexes.   Current GDMT Has been started on high-dose ibuprofen 800 3 times daily and colchicine 0.6 mg twice daily.       Current ECG   Previous ECG from November 2023       Quick resolution of patient's symptoms and lack of fevers and  findings of ECG changes that were present previously also suggests less likely that this is pericarditis.      PLAN:  Recommend treatment for allergic asthma exacerbation.    Will assess tomorrow and probably discontinue ibuprofen and colchicine.  Consider adding antihistamine therapy as well.  Can hold off on echocardiogram unless patient is having recurring chest pain.  If patient is better and based to baseline and has no symptoms then can likely be discharged to home tomorrow.      Asthma exacerbation  Evaluation and plan as noted above.    Please contact the SecureChat role for the Cardiology service with any questions/concerns.    History of Present Illness   Evelin Christianson is a 34 y.o. male who presents with acute shortness of breath last night.     History is obtained with the help of [place] telephone  ( [phone]).  His prior medical history significant for mild intermittent asthma.  He takes inhalers from time to time.  Denies being hospitalized before.  Reports that he has not seen his primary doctor for over a year.  He mentions that in the last 3 days he has been having bouts of coughing spells and shortness of breath.  He relates the symptoms to allergy to pollen.  He reports that his eyes have been itching and his nose have been running.  Yesterday his symptoms became worse and he had incessant cough following which he also experienced chest pain.  Pain was sharp in nature and was radiating to his back.  On arrival to emergency room patient was noted to be tachycardic and tachypneic and had elevated blood pressure.  Oxygen saturation was 97% on room air.  His lung sounds were decreased and was wheezing.  He was given IV and inhaled steroid and bronchodilator therapy.  His ECG was noted to have diffuse concave ST elevation suspicious for pericarditis.  His inflammatory markers were elevated.  Patient was admitted for treatment for asthma and possible pericarditis.    Overnight  patient reports that he is feeling much better.  He denies chest pain.  He reports his shortness of breath is better but still not resolved completely.  He denies any orthopnea or PND.  He reports that prior to current illness he had been fine but he does get intermittent asthma symptoms.     He has no previous history of coronary artery disease or congestive heart failure or hypertension or diabetes or arrhythmias.  He reports being active.    He denies any alcohol use of smoking history.    Review of Systems   Constitutional:  Positive for fatigue. Negative for fever.   HENT:  Positive for congestion, rhinorrhea and sneezing.    Respiratory:  Positive for cough, chest tightness, shortness of breath and wheezing.    Cardiovascular:  Positive for chest pain and palpitations. Negative for leg swelling.   Gastrointestinal: Negative.    Genitourinary: Negative.    Musculoskeletal: Negative.    Skin: Negative.    Neurological:  Negative for dizziness.   Psychiatric/Behavioral: Negative.     All other systems reviewed and are negative.    Historical Information   Past Medical History:   Diagnosis Date    Asthma      No past surgical history on file.  Social History     Tobacco Use    Smoking status: Never    Smokeless tobacco: Never   Substance and Sexual Activity    Alcohol use: Not Currently     Comment: Occ    Drug use: Never    Sexual activity: Not on file     E-Cigarette/Vaping     E-Cigarette/Vaping Substances       Objective :  Temp:  [96.8 °F (36 °C)-98.5 °F (36.9 °C)] 96.8 °F (36 °C)  HR:  [110-125] 121  BP: (140-171)/() 171/112  Resp:  [17-23] 18  SpO2:  [95 %-98 %] 96 %  O2 Device: None (Room air)  Orthostatic Blood Pressures      Flowsheet Row Most Recent Value   Blood Pressure 171/112 filed at 05/09/2025 1800   Patient Position - Orthostatic VS Sitting filed at 05/09/2025 1800          First Weight: Weight - Scale: 91.7 kg (202 lb 2.6 oz) (05/09/25 1800)  Vitals:    05/09/25 1800   Weight: 91.7 kg (202  "lb 2.6 oz)       Physical Exam  Vitals reviewed.   Constitutional:       Appearance: He is well-developed. He is obese. He is not ill-appearing or diaphoretic.   Cardiovascular:      Rate and Rhythm: Tachycardia present.      Heart sounds: No murmur heard.  Pulmonary:      Effort: Pulmonary effort is normal.      Breath sounds: Examination of the right-upper field reveals decreased breath sounds. Examination of the left-upper field reveals decreased breath sounds. Examination of the right-middle field reveals decreased breath sounds. Examination of the left-middle field reveals decreased breath sounds. Examination of the right-lower field reveals decreased breath sounds. Examination of the left-lower field reveals decreased breath sounds. Decreased breath sounds, wheezing and rhonchi present. No rales.   Musculoskeletal:      Right lower leg: No edema.      Left lower leg: No edema.   Skin:     General: Skin is warm and dry.   Neurological:      Mental Status: He is alert and oriented to person, place, and time.   Psychiatric:         Mood and Affect: Mood normal.         Lab Results: I have reviewed the following results:CBC/BMP:   .     05/09/25  0949   WBC 9.19   HGB 15.1   HCT 45.3      SODIUM 137   K 3.9      CO2 27   BUN 8   CREATININE 0.80   GLUC 130      Results from last 7 days   Lab Units 05/09/25  0949   WBC Thousand/uL 9.19   HEMOGLOBIN g/dL 15.1   HEMATOCRIT % 45.3   PLATELETS Thousands/uL 303     Results from last 7 days   Lab Units 05/09/25  0949   POTASSIUM mmol/L 3.9   CHLORIDE mmol/L 102   CO2 mmol/L 27   BUN mg/dL 8   CREATININE mg/dL 0.80   CALCIUM mg/dL 9.7         No results found for: \"HGBA1C\"  No results found for: \"CKTOTAL\", \"CKMB\", \"CKMBINDEX\", \"TROPONINI\"    Imaging Results Review: I reviewed radiology reports from this admission including: chest xray.  Other Study Results Review: EKG was reviewed.     VTE Prophylaxis: VTE covered by:    None       "

## 2025-05-09 NOTE — ASSESSMENT & PLAN NOTE
Patient initially presented with chest pain, possibly secondary to nebulizer treatments received in the ER  He has no further pain but did review EKG with evidence of ST segment elevation in all leads concerning for possible pericarditis  Will rule out viral illness, check COVID and flu screening  Start colchicine, ibuprofen and prednisone  Echocardiogram pending  Monitor on telemetry  Check ESR and CRP  Troponins are negative and no evidence of myopericarditis  Case discussed with cardiology and pending formal evaluation

## 2025-05-09 NOTE — ED PROVIDER NOTES
ED Disposition       None          Assessment & Plan       Medical Decision Making  Likely acute asthma exacerbation. Will give ZAMBRANO neb, mag and solumedrol.     Signed out to Collette Hilliard PA-C     Amount and/or Complexity of Data Reviewed  Labs: ordered.    Risk  Prescription drug management.        ED Course as of 05/09/25 1036   Fri May 09, 2025   1035 S/O to GLENDA RASHID: pending re-eval after ZAMBRANO neb, mag, solumedrol.        Medications   magnesium sulfate 2 g/50 mL IVPB (premix) 2 g (2 g Intravenous New Bag 5/9/25 0953)   methylPREDNISolone sodium succinate (Solu-MEDROL) injection 125 mg (125 mg Intravenous Given 5/9/25 0950)   albuterol inhalation solution 10 mg (10 mg Nebulization Given 5/9/25 1004)   ipratropium (ATROVENT) 0.02 % inhalation solution 1 mg (1 mg Nebulization Given 5/9/25 1005)   sodium chloride 0.9 % inhalation solution 12 mL (12 mL Nebulization Given 5/9/25 1005)       ED Risk Strat Scores                    No data recorded                            History of Present Illness       Chief Complaint   Patient presents with    Shortness of Breath     Pt reports SOB worsening the past few days, nasal congestion, chest pain and back pain       Past Medical History:   Diagnosis Date    Asthma       No past surgical history on file.   No family history on file.   Social History     Tobacco Use    Smoking status: Never    Smokeless tobacco: Never   Substance Use Topics    Alcohol use: Not Currently     Comment: Occ    Drug use: Never      E-Cigarette/Vaping      E-Cigarette/Vaping Substances      I have reviewed and agree with the history as documented.     34 YOM with PMH asthma presents today initially coming in for nasal congestion. However on arrival pt is in respiratory distress, tachypnea, increased work of breathing. Pt reports this has been worsening the past 4 days. Reports that he does have a nebulizer machine at home but ran out of the solution. Denies fever, chills.     Critical Care  Time Statement: Upon my evaluation, this patient had a high probability of imminent or life-threatening deterioration due to respiratory distress, which required my direct attention, intervention, and personal management.  I spent a total of 30 minutes directly providing critical care services, including interpretation of complex medical databases, evaluating for the presence of life-threatening injuries or illnesses, and interpretation of hemodynamic data. This time is exclusive of procedures, teaching, treating other patients, family meetings, and any prior time recorded by providers other than myself.            Review of Systems   Constitutional:  Negative for fever.   HENT:  Positive for congestion.    Respiratory:  Positive for chest tightness and shortness of breath. Negative for cough.    Cardiovascular:  Negative for chest pain and palpitations.   Gastrointestinal:  Negative for nausea and vomiting.           Objective       ED Triage Vitals [05/09/25 0937]   Temperature Pulse Blood Pressure Respirations SpO2 Patient Position - Orthostatic VS   98.5 °F (36.9 °C) (!) 125 (!) 156/113 (!) 23 97 % --      Temp src Heart Rate Source BP Location FiO2 (%) Pain Score    -- Monitor -- -- --      Vitals      Date and Time Temp Pulse SpO2 Resp BP Pain Score FACES Pain Rating User   05/09/25 0937 98.5 °F (36.9 °C) 125 97 % 23 156/113 -- -- KD            Physical Exam  Vitals and nursing note reviewed.   Constitutional:       General: He is in acute distress.      Appearance: He is well-developed.   HENT:      Head: Normocephalic and atraumatic.   Eyes:      Conjunctiva/sclera: Conjunctivae normal.   Cardiovascular:      Rate and Rhythm: Regular rhythm. Tachycardia present.      Heart sounds: No murmur heard.  Pulmonary:      Effort: Tachypnea and respiratory distress present.      Breath sounds: Decreased air movement present. Decreased breath sounds and wheezing present.   Abdominal:      Palpations: Abdomen is soft.       Tenderness: There is no abdominal tenderness.   Musculoskeletal:         General: No swelling.      Cervical back: Neck supple.   Skin:     General: Skin is warm and dry.      Capillary Refill: Capillary refill takes less than 2 seconds.   Neurological:      Mental Status: He is alert.   Psychiatric:         Mood and Affect: Mood normal.         Results Reviewed       Procedure Component Value Units Date/Time    Basic metabolic panel [[id number]] Collected: 05/09/25 0949    Lab Status: Final result Specimen: Blood from Arm, Right Updated: 05/09/25 1013     Sodium 137 mmol/L      Potassium 3.9 mmol/L      Chloride 102 mmol/L      CO2 27 mmol/L      ANION GAP 8 mmol/L      BUN 8 mg/dL      Creatinine 0.80 mg/dL      Glucose 130 mg/dL      Calcium 9.7 mg/dL      eGFR 118 ml/min/1.73sq m     Narrative:      National Kid[employer] Disease Foundation guidelines for Chronic Kidney Disease (CKD):     Stage 1 with normal or high GFR (GFR > 90 mL/min/1.73 square meters)    Stage 2 Mild CKD (GFR = 60-89 mL/min/1.73 square meters)    Stage 3A Moderate CKD (GFR = 45-59 mL/min/1.73 square meters)    Stage 3B Moderate CKD (GFR = 30-44 mL/min/1.73 square meters)    Stage 4 Severe CKD (GFR = 15-29 mL/min/1.73 square meters)    Stage 5 End Stage CKD (GFR <15 mL/min/1.73 square meters)  Note: GFR calculation is accurate only with a steady state creatinine    CBC and differential [[id number]]  (Abnormal) Collected: 05/09/25 0949    Lab Status: Final result Specimen: Blood from Arm, Right Updated: 05/09/25 0958     WBC 9.19 Thousand/uL      RBC 5.52 Million/uL      Hemoglobin 15.1 g/dL      Hematocrit 45.3 %      MCV 82 fL      MCH 27.4 pg      MCHC 33.3 g/dL      RDW 14.9 %      MPV 11.0 fL      Platelets 303 Thousands/uL      nRBC 0 /100 WBCs      Segmented % 63 %      Immature Grans % 0 %      Lymphocytes % 23 %      Monocytes % 5 %      Eosinophils Relative 8 %      Basophils Relative 1 %      Absolute Neutrophils 5.77 Thousands/µL       Absolute Immature Grans 0.03 Thousand/uL      Absolute Lymphocytes 2.12 Thousands/µL      Absolute Monocytes 0.48 Thousand/µL      Eosinophils Absolute 0.73 Thousand/µL      Basophils Absolute 0.06 Thousands/µL             No orders to display       Procedures    ED Medication and Procedure Management   Prior to Admission Medications   Prescriptions Last Dose Informant Patient Reported? Taking?   albuterol (2.5 mg/3 mL) 0.083 % nebulizer solution   No No   Sig: Take 3 mL (2.5 mg total) by nebulization every 6 (six) hours as needed for wheezing or shortness of breath   albuterol (2.5 mg/3 mL) 0.083 % nebulizer solution   No No   Sig: Take 3 mL (2.5 mg total) by nebulization every 6 (six) hours as needed for wheezing or shortness of breath   albuterol (PROVENTIL HFA,VENTOLIN HFA) 90 mcg/act inhaler   Yes No   Sig: Inhale 2 puffs every 6 (six) hours as needed for wheezing   albuterol (Proventil HFA) 90 mcg/act inhaler   No No   Sig: Inhale 2 puffs every 6 (six) hours as needed for wheezing   albuterol (Proventil HFA) 90 mcg/act inhaler   No No   Sig: Inhale 1-2 puffs every 6 (six) hours as needed for wheezing or shortness of breath   albuterol (Proventil HFA) 90 mcg/act inhaler   No No   Sig: Inhale 1-2 puffs every 6 (six) hours as needed for wheezing or shortness of breath   albuterol (Ventolin HFA) 90 mcg/act inhaler   No No   Sig: Inhale 2 puffs every 6 (six) hours as needed for wheezing   dextromethorphan-guaifenesin (MUCINEX DM)  MG per 12 hr tablet   No No   Sig: Take 1 tablet by mouth every 12 (twelve) hours as needed for cough   fluticasone (CUTIVATE) 0.05 % cream   No No   Sig: Apply topically 2 (two) times a day   predniSONE 10 mg tablet   No No   Si tabs daily for 4 days then, 4 tabs daily for 2 days then, 2 tabs daily for 2 days then, 1 tab daily 2 days   predniSONE 20 mg tablet   No No   Sig: Take 3 tablets (60 mg total) by mouth daily      Facility-Administered Medications: None     Patient's  Medications   Discharge Prescriptions    No medications on file     No discharge procedures on file.  ED SEPSIS DOCUMENTATION            Dolly Hsu PA-C  05/09/25 1571

## 2025-05-09 NOTE — ASSESSMENT & PLAN NOTE
Patient has a history of asthma, previously seen by [facility] pulmonology.  Patient also has seasonal allergies and has not been taking antihistamines  Denies nicotine use  Medication noncompliance, off of his Wixela  Status post IV Solu-Medrol nebulizers with improvement in chest pain and wheezing  He is on room air, no significant wheezing bedside today  Chest x-ray without infiltrate, viral panel negative  Remains afebrile  Reports all of his presenting symptoms are resolving  Resume home Wixela, albuterol rescue inhaler as needed and nebulizer as needed  Restart antihistamine at home  Continue oral prednisone 40 mg to complete week course  Outpatient pulmonology follow-up for formal PFTs once recovered

## 2025-05-09 NOTE — H&P
H&P - Hospitalist   Name: Evelin Christianson 34 y.o. male I MRN: [id number]  Unit/Bed#: E4 -01 I Date of Admission: 5/9/2025   Date of Service: 5/9/2025 I Hospital Day: 0         Assessment and Plan  * Asthma exacerbation  Assessment & Plan  Patient presenting with asthma exacerbation.   They are being admitted for nebulizer treatments as well as IV steriods.   The meet admission criteria due to multiple nebs in the Emergency room, low oxygen saturations, and respiratory difficulty.   Will continue treatments at this time with low threshold for BIPAP or other advanced airway management.     At this time wheezing is mostly resolved  Continue prednisone 40 mg  Levabuterol inhalers scheduled and as needed  Formoterol, Pulmicort and ipratropium nebulizers  Patient would benefit from established PFTs once recovered and pulmonary follow-up      Pericarditis  Assessment & Plan  Patient initially presented with chest pain, possibly secondary to nebulizer treatments received in the ER  He has no further pain but did review EKG with evidence of ST segment elevation in all leads concerning for possible pericarditis  Will rule out viral illness, check COVID and flu screening  Start colchicine, ibuprofen and prednisone  Echocardiogram pending  Monitor on telemetry  Check ESR and CRP  Troponins are negative and no evidence of myopericarditis  Case discussed with cardiology and pending formal evaluation        Code Status: Level 1 - Full Code     VTE Prophylaxis:  Early ambulation, patient is considered low risk   /     Discussion with family: Patient updating family    Anticipated Length of Stay:  Patient will be admitted on an Observation basis with an anticipated length of stay of less than 2 midnights.   Justification for Hospital Stay: Asthma exacerbation , possible pericarditis and tachycardia  Total Time for Visit, including Counseling / Coordination of Care: 76 minutes.  Greater than 50% of this total time spent on  direct patient counseling and coordination of care.    Chief Complaint:     Chief Complaint   Patient presents with    Shortness of Breath     Pt reports SOB worsening the past few days, nasal congestion, chest pain and back pain       History of Present Illness:    Evelin Christianson is a 34 y.o. male who presents with shortness of breath and difficulty breathing.  The patient has a past medical history of asthma, he takes albuterol as needed.  In the emergency room he was given IV steroids as well as nebulizer treatments, he reported having chest pain and chest discomfort.  EKG performed showed possible pericarditis, at the time of evaluation the patient was chest pain-free, denies any nausea vomiting or diarrhea.  He has no recent travel or trip history.    Concern for possible pericarditis the ER had requested observation admission for evaluation of abnormal EKG.    Patient denies any cardiac history, denies any history of diabetes mellitus, he has no recent travel or trip history and no fevers or chills.    Review of Systems:    A complete and comprehensive 14 point organ system review was performed and all other systems are negative other than stated above in the HPI    Past Medical and Surgical History:     Past Medical History:   Diagnosis Date    Asthma        No past surgical history on file.    Meds/Allergies:    Prior to Admission medications    Medication Sig Start Date End Date Taking? Authorizing Provider   albuterol (2.5 mg/3 mL) 0.083 % nebulizer solution Take 3 mL (2.5 mg total) by nebulization every 6 (six) hours as needed for wheezing or shortness of breath  Patient not taking: Reported on 5/9/2025 5/22/24   Tommy Estrada PA-C   albuterol (2.5 mg/3 mL) 0.083 % nebulizer solution Take 3 mL (2.5 mg total) by nebulization every 6 (six) hours as needed for wheezing or shortness of breath 6/29/24   Eldon Cardoza MD   albuterol (Proventil HFA) 90 mcg/act inhaler Inhale 2 puffs every 6 (six)  hours as needed for wheezing 11/28/23   Jayla Olson DO   albuterol (Proventil HFA) 90 mcg/act inhaler Inhale 1-2 puffs every 6 (six) hours as needed for wheezing or shortness of breath 12/20/23   Tommy Estrada PA-C   albuterol (Proventil HFA) 90 mcg/act inhaler Inhale 1-2 puffs every 6 (six) hours as needed for wheezing or shortness of breath 5/22/24   Tommy Estrada PA-C   albuterol (PROVENTIL HFA,VENTOLIN HFA) 90 mcg/act inhaler Inhale 2 puffs every 6 (six) hours as needed for wheezing    Historical Provider, MD   albuterol (Ventolin HFA) 90 mcg/act inhaler Inhale 2 puffs every 6 (six) hours as needed for wheezing 4/24/24   Sasha Sharma MD   dextromethorphan-guaifenesin (MUCINEX DM)  MG per 12 hr tablet Take 1 tablet by mouth every 12 (twelve) hours as needed for cough 12/20/23   Tommy Estrada PA-C   fluticasone (CUTIVATE) 0.05 % cream Apply topically 2 (two) times a day 6/29/24   Eldon Cardoza MD   predniSONE 10 mg tablet 6 tabs daily for 4 days then, 4 tabs daily for 2 days then, 2 tabs daily for 2 days then, 1 tab daily 2 days 11/28/23   Jayla Olson DO   predniSONE 20 mg tablet Take 3 tablets (60 mg total) by mouth daily 6/29/24   Eldon Cardoza MD     I have reviewed home medications with patient personally.    Allergies: No Known Allergies    Social History:     Marital Status: Single   Occupation: Unknown    Substance Use History:   Social History     Substance and Sexual Activity   Alcohol Use Not Currently    Comment: Occ     Social History     Tobacco Use   Smoking Status Never   Smokeless Tobacco Never     Social History     Substance and Sexual Activity   Drug Use Never       Family History:    No family history on file.    Physical Exam:     Vitals:   Blood Pressure: (!) 171/112 (05/09/25 1800)  Pulse: (!) 121 (05/09/25 1800)  Temperature: (!) 96.8 °F (36 °C) (05/09/25 1800)  Temp Source: Temporal (05/09/25 1800)  Respirations: 18 (05/09/25  1800)  Weight - Scale: 91.7 kg (202 lb 2.6 oz) (05/09/25 1800)  SpO2: 96 % (05/09/25 1800)      General: well appearing, no acute distress  HEENT: atraumatic, PERRLA, moist mucosa, normal pharynx, normal tonsils and adenoids, normal tongue, no fluid in sinuses  Neck: Trachea midline, no carotid bruit, no masses  Respiratory: normal chest wall expansion, mild expiratory wheeze, no r/r/w, no rubs  Cardiovascular: RRR, no m/r/g, Normal S1 and S2  Abdomen: Soft, non-tender, non-distended, normal bowel sounds in all quadrants, no hepatosplenomegaly, no tympany  Rectal: deferred  Musculoskeletal: normal ROM in upper and lower extremities  Integumentary: warm, dry, and pink, with no rash, purpura, or petechia  Heme/Lymph: no lymphadenopathy, no bruises  Neurological: Cranial Nerves II-XII grossly intact  Psychiatric: cooperative with normal mood, affect, and cognition    Additional Data:     Lab Results: Laboratory studies reviewed for today    Results from last 7 days   Lab Units 05/09/25  0949   WBC Thousand/uL 9.19   HEMOGLOBIN g/dL 15.1   HEMATOCRIT % 45.3   PLATELETS Thousands/uL 303   SEGS PCT % 63   LYMPHO PCT % 23   MONO PCT % 5   EOS PCT % 8*     Results from last 7 days   Lab Units 05/09/25  0949   SODIUM mmol/L 137   POTASSIUM mmol/L 3.9   CHLORIDE mmol/L 102   CO2 mmol/L 27   BUN mg/dL 8   CREATININE mg/dL 0.80   ANION GAP mmol/L 8   CALCIUM mg/dL 9.7   GLUCOSE RANDOM mg/dL 130                     Results from last 7 days   Lab Units 05/09/25  1439   HS TNI 0HR ng/L <2       Imaging: Results Review Statement: I personally reviewed the following image studies/reports in PACS and discussed pertinent findings with Radiology: chest xray. My interpretation of the radiology images/reports is: No acute chest pathology.    XR chest 2 views   ED Interpretation by Collette Hilliard PA-C (05/09 2326)   No acute abnormalities seen by me at this time.      Final Result by Darius Lucas MD (05/09 2472)      No acute  cardiopulmonary disease.            Workstation performed: [id number]             EKG, Pathology, and Other Studies Reviewed on Admission:   EKG: EKG reviewed by me shows sinus tachycardia, diffuse ST elevations in all leads, this is either early repolarization abnormality versus pericarditis.      Allscripts / Epic Records Reviewed: Yes    ** Please Note: This note was completed in part utilizing [employer] Dragon [employer] One Software.  Grammatical errors, random word insertions, spelling mistakes, and incomplete sentences may be an occasional consequence of this system secondary to software limitations, ambient noise, and hardware issues.  If you have any questions or concerns about the content, text, or information contained within the body of this dictation, please contact the provider for clarification.**

## 2025-05-09 NOTE — PLAN OF CARE
Problem: PAIN - ADULT  Goal: Verbalizes/displays adequate comfort level or baseline comfort level  Description: Interventions:- Encourage patient to monitor pain and request assistance- Assess pain using appropriate pain scale- Administer analgesics based on type and severity of pain and evaluate response- Implement non-pharmacological measures as appropriate and evaluate response- Consider cultural and social influences on pain and pain management- Notify physician/advanced practitioner if interventions unsuccessful or patient reports new pain  Outcome: Progressing     Problem: INFECTION - ADULT  Goal: Absence or prevention of progression during hospitalization  Description: INTERVENTIONS:- Assess and monitor for signs and symptoms of infection- Monitor lab/diagnostic results- Monitor all insertion sites, i.e. indwelling lines, tubes, and drains- Monitor endotracheal if appropriate and nasal secretions for changes in amount and color- [place] appropriate cooling/warming therapies per order- Administer medications as ordered- Instruct and encourage patient and family to use good hand hygiene technique- Identify and instruct in appropriate isolation precautions for identified infection/condition  Outcome: Progressing  Goal: Absence of fever/infection during neutropenic period  Description: INTERVENTIONS:- Monitor WBC  Outcome: Progressing     Problem: SAFETY ADULT  Goal: Patient will remain free of falls  Description: INTERVENTIONS:- Educate patient/family on patient safety including physical limitations- Instruct patient to call for assistance with activity - Consult OT/PT to assist with strengthening/mobility - Keep Call bell within reach- Keep bed low and locked with side rails adjusted as appropriate- Keep care items and personal belongings within reach- Initiate and maintain comfort rounds- Make Fall Risk Sign visible to staff- Offer Toileting every 3 Hours, in advance of need- Initiate/Maintain bed alarm-  Obtain necessary fall risk management equipment: - Apply yellow socks and bracelet for high fall risk patients- Consider moving patient to room near nurses station  Outcome: Progressing  Goal: Maintain or return to baseline ADL function  Description: INTERVENTIONS:-  Assess patient's ability to carry out ADLs; assess patient's baseline for ADL function and identify physical deficits which impact ability to perform ADLs (bathing, care of mouth/teeth, toileting, grooming, dressing, etc.)- Assess/evaluate cause of self-care deficits - Assess range of motion- Assess patient's mobility; develop plan if impaired- Assess patient's need for assistive devices and provide as appropriate- Encourage maximum independence but intervene and supervise when necessary- Involve family in performance of ADLs- Assess for home care needs following discharge - Consider OT consult to assist with ADL evaluation and planning for discharge- Provide patient education as appropriate  Outcome: Progressing  Goal: Maintains/Returns to pre admission functional level  Description: INTERVENTIONS:- Perform AM-PAC 6 Click Basic Mobility/ Daily Activity assessment daily.- Set and communicate daily mobility goal to care team and patient/family/caregiver. - Collaborate with rehabilitation services on mobility goals if consulted- Perform Range of Motion 3 times a day.- Reposition patient every 2 hours.- Dangle patient 3 times a day- Stand patient 3 times a day- Ambulate patient 3 times a day- Out of bed to chair 3 times a day - Out of bed for meals 3 times a day- Out of bed for toileting- Record patient progress and toleration of activity level   Outcome: Progressing     Problem: DISCHARGE PLANNING  Goal: Discharge to home or other facility with appropriate resources  Description: INTERVENTIONS:- Identify barriers to discharge w/patient and caregiver- Arrange for needed discharge resources and transportation as appropriate- Identify discharge learning  needs (meds, wound care, etc.)- Arrange for interpretive services to assist at discharge as needed- Refer to Case Management Department for coordinating discharge planning if the patient needs post-hospital services based on physician/advanced practitioner order or complex needs related to functional status, cognitive ability, or social support system  Outcome: Progressing     Problem: Knowledge Deficit  Goal: Patient/family/caregiver demonstrates understanding of disease process, treatment plan, medications, and discharge instructions  Description: Complete learning assessment and assess knowledge base.Interventions:- Provide teaching at level of understanding- Provide teaching via preferred learning methods  Outcome: Progressing

## 2025-05-10 ENCOUNTER — APPOINTMENT (OUTPATIENT)
Dept: NON INVASIVE DIAGNOSTICS | Facility: HOSPITAL | Age: 35
End: 2025-05-10

## 2025-05-10 VITALS
TEMPERATURE: 97.8 F | SYSTOLIC BLOOD PRESSURE: 156 MMHG | HEIGHT: 69 IN | WEIGHT: 202.16 LBS | HEART RATE: 101 BPM | BODY MASS INDEX: 29.94 KG/M2 | OXYGEN SATURATION: 94 % | RESPIRATION RATE: 18 BRPM | DIASTOLIC BLOOD PRESSURE: 98 MMHG

## 2025-05-10 PROBLEM — R07.9 CHEST PAIN: Status: ACTIVE | Noted: 2025-05-10

## 2025-05-10 PROBLEM — I10 ESSENTIAL HYPERTENSION: Status: ACTIVE | Noted: 2025-05-10

## 2025-05-10 PROBLEM — J45.41 MODERATE PERSISTENT ASTHMA WITH EXACERBATION: Status: ACTIVE | Noted: 2025-05-09

## 2025-05-10 PROBLEM — R07.9 CHEST PAIN: Status: RESOLVED | Noted: 2025-05-10 | Resolved: 2025-05-10

## 2025-05-10 LAB
ANION GAP SERPL CALCULATED.3IONS-SCNC: 8 MMOL/L (ref 4–13)
BASOPHILS # BLD AUTO: 0.02 THOUSANDS/ÂΜL (ref 0–0.1)
BASOPHILS NFR BLD AUTO: 0 % (ref 0–1)
BUN SERPL-MCNC: 12 MG/DL (ref 5–25)
CALCIUM SERPL-MCNC: 9.6 MG/DL (ref 8.4–10.2)
CHLORIDE SERPL-SCNC: 107 MMOL/L (ref 96–108)
CO2 SERPL-SCNC: 22 MMOL/L (ref 21–32)
CREAT SERPL-MCNC: 0.77 MG/DL (ref 0.6–1.3)
EOSINOPHIL # BLD AUTO: 0.09 THOUSAND/ÂΜL (ref 0–0.61)
EOSINOPHIL NFR BLD AUTO: 1 % (ref 0–6)
ERYTHROCYTE [DISTWIDTH] IN BLOOD BY AUTOMATED COUNT: 15.1 % (ref 11.6–15.1)
GFR SERPL CREATININE-BSD FRML MDRD: 120 ML/MIN/1.73SQ M
GLUCOSE SERPL-MCNC: 134 MG/DL (ref 65–140)
HCT VFR BLD AUTO: 45.1 % (ref 36.5–49.3)
HGB BLD-MCNC: 14.4 G/DL (ref 12–17)
IMM GRANULOCYTES # BLD AUTO: 0.06 THOUSAND/UL (ref 0–0.2)
IMM GRANULOCYTES NFR BLD AUTO: 1 % (ref 0–2)
LYMPHOCYTES # BLD AUTO: 1.6 THOUSANDS/ÂΜL (ref 0.6–4.47)
LYMPHOCYTES NFR BLD AUTO: 12 % (ref 14–44)
MCH RBC QN AUTO: 26.5 PG (ref 26.8–34.3)
MCHC RBC AUTO-ENTMCNC: 31.9 G/DL (ref 31.4–37.4)
MCV RBC AUTO: 83 FL (ref 82–98)
MONOCYTES # BLD AUTO: 0.94 THOUSAND/ÂΜL (ref 0.17–1.22)
MONOCYTES NFR BLD AUTO: 7 % (ref 4–12)
NEUTROPHILS # BLD AUTO: 10.32 THOUSANDS/ÂΜL (ref 1.85–7.62)
NEUTS SEG NFR BLD AUTO: 79 % (ref 43–75)
NRBC BLD AUTO-RTO: 0 /100 WBCS
PLATELET # BLD AUTO: 308 THOUSANDS/UL (ref 149–390)
PMV BLD AUTO: 11.1 FL (ref 8.9–12.7)
POTASSIUM SERPL-SCNC: 4.3 MMOL/L (ref 3.5–5.3)
RBC # BLD AUTO: 5.43 MILLION/UL (ref 3.88–5.62)
SODIUM SERPL-SCNC: 137 MMOL/L (ref 135–147)
WBC # BLD AUTO: 13.03 THOUSAND/UL (ref 4.31–10.16)

## 2025-05-10 PROCEDURE — 94760 N-INVAS EAR/PLS OXIMETRY 1: CPT

## 2025-05-10 PROCEDURE — 85025 COMPLETE CBC W/AUTO DIFF WBC: CPT | Performed by: INTERNAL MEDICINE

## 2025-05-10 PROCEDURE — 99239 HOSP IP/OBS DSCHRG MGMT >30: CPT

## 2025-05-10 PROCEDURE — 80048 BASIC METABOLIC PNL TOTAL CA: CPT | Performed by: INTERNAL MEDICINE

## 2025-05-10 PROCEDURE — 93005 ELECTROCARDIOGRAM TRACING: CPT

## 2025-05-10 PROCEDURE — 94640 AIRWAY INHALATION TREATMENT: CPT

## 2025-05-10 RX ORDER — FLUTICASONE PROPIONATE AND SALMETEROL 500; 50 UG/1; UG/1
1 POWDER RESPIRATORY (INHALATION) 2 TIMES DAILY
Qty: 60 BLISTER | Refills: 0 | Status: SHIPPED | OUTPATIENT
Start: 2025-05-10 | End: 2025-06-09

## 2025-05-10 RX ORDER — ALBUTEROL SULFATE 90 UG/1
2 INHALANT RESPIRATORY (INHALATION) EVERY 6 HOURS PRN
Qty: 6.7 G | Refills: 0 | Status: CANCELLED | OUTPATIENT
Start: 2025-05-10

## 2025-05-10 RX ORDER — LORATADINE 10 MG/1
10 TABLET ORAL DAILY
Qty: 30 TABLET | Refills: 0 | Status: SHIPPED | OUTPATIENT
Start: 2025-05-10

## 2025-05-10 RX ORDER — ALBUTEROL SULFATE 90 UG/1
2 INHALANT RESPIRATORY (INHALATION) EVERY 6 HOURS PRN
Qty: 8.5 G | Refills: 0 | Status: SHIPPED | OUTPATIENT
Start: 2025-05-10

## 2025-05-10 RX ORDER — LISINOPRIL 10 MG/1
10 TABLET ORAL DAILY
Qty: 30 TABLET | Refills: 0 | Status: SHIPPED | OUTPATIENT
Start: 2025-05-10

## 2025-05-10 RX ORDER — ALBUTEROL SULFATE 90 UG/1
2 INHALANT RESPIRATORY (INHALATION) EVERY 6 HOURS PRN
Qty: 6.7 G | Refills: 0 | Status: SHIPPED | OUTPATIENT
Start: 2025-05-10

## 2025-05-10 RX ORDER — ALBUTEROL SULFATE 0.83 MG/ML
2.5 SOLUTION RESPIRATORY (INHALATION) EVERY 6 HOURS PRN
Qty: 360 ML | Refills: 0 | Status: SHIPPED | OUTPATIENT
Start: 2025-05-10

## 2025-05-10 RX ORDER — PREDNISONE 20 MG/1
40 TABLET ORAL DAILY
Qty: 10 TABLET | Refills: 0 | Status: SHIPPED | OUTPATIENT
Start: 2025-05-10 | End: 2025-05-15

## 2025-05-10 RX ORDER — LISINOPRIL 10 MG/1
10 TABLET ORAL DAILY
Status: DISCONTINUED | OUTPATIENT
Start: 2025-05-10 | End: 2025-05-10 | Stop reason: HOSPADM

## 2025-05-10 RX ORDER — BENZONATATE 100 MG/1
100 CAPSULE ORAL 3 TIMES DAILY PRN
Qty: 21 CAPSULE | Refills: 0 | Status: SHIPPED | OUTPATIENT
Start: 2025-05-10

## 2025-05-10 RX ADMIN — PREDNISONE 40 MG: 20 TABLET ORAL at 09:33

## 2025-05-10 RX ADMIN — BUDESONIDE 0.5 MG: 0.5 INHALANT RESPIRATORY (INHALATION) at 07:33

## 2025-05-10 RX ADMIN — FORMOTEROL FUMARATE DIHYDRATE 20 MCG: 20 SOLUTION RESPIRATORY (INHALATION) at 07:33

## 2025-05-10 RX ADMIN — PANTOPRAZOLE SODIUM 40 MG: 40 TABLET, DELAYED RELEASE ORAL at 05:55

## 2025-05-10 RX ADMIN — IPRATROPIUM BROMIDE 0.5 MG: 0.5 SOLUTION RESPIRATORY (INHALATION) at 07:33

## 2025-05-10 RX ADMIN — COLCHICINE 0.6 MG: 0.6 TABLET ORAL at 09:33

## 2025-05-10 RX ADMIN — LISINOPRIL 10 MG: 10 TABLET ORAL at 11:09

## 2025-05-10 RX ADMIN — IBUPROFEN 800 MG: 400 TABLET ORAL at 05:55

## 2025-05-10 NOTE — DISCHARGE INSTR - AVS FIRST PAGE
Follow up with your family doctor in 1 week and set an appointment with your pulmonologist at [facility].

## 2025-05-10 NOTE — PLAN OF CARE
Problem: PAIN - ADULT  Goal: Verbalizes/displays adequate comfort level or baseline comfort level  Description: Interventions:- Encourage patient to monitor pain and request assistance- Assess pain using appropriate pain scale- Administer analgesics based on type and severity of pain and evaluate response- Implement non-pharmacological measures as appropriate and evaluate response- Consider cultural and social influences on pain and pain management- Notify physician/advanced practitioner if interventions unsuccessful or patient reports new pain  Outcome: Progressing     Problem: INFECTION - ADULT  Goal: Absence or prevention of progression during hospitalization  Description: INTERVENTIONS:- Assess and monitor for signs and symptoms of infection- Monitor lab/diagnostic results- Monitor all insertion sites, i.e. indwelling lines, tubes, and drains- Monitor endotracheal if appropriate and nasal secretions for changes in amount and color- [place] appropriate cooling/warming therapies per order- Administer medications as ordered- Instruct and encourage patient and family to use good hand hygiene technique- Identify and instruct in appropriate isolation precautions for identified infection/condition  Outcome: Progressing  Goal: Absence of fever/infection during neutropenic period  Description: INTERVENTIONS:- Monitor WBC  Outcome: Progressing     Goal: Maintain or return to baseline ADL function  Description: INTERVENTIONS:-  Assess patient's ability to carry out ADLs; assess patient's baseline for ADL function and identify physical deficits which impact ability to perform ADLs (bathing, care of mouth/teeth, toileting, grooming, dressing, etc.)- Assess/evaluate cause of self-care deficits - Assess range of motion- Assess patient's mobility; develop plan if impaired- Assess patient's need for assistive devices and provide as appropriate- Encourage maximum independence but intervene and supervise when necessary-  Involve family in performance of ADLs- Assess for home care needs following discharge - Consider OT consult to assist with ADL evaluation and planning for discharge- Provide patient education as appropriate  Outcome: Progressing     Problem: DISCHARGE PLANNING  Goal: Discharge to home or other facility with appropriate resources  Description: INTERVENTIONS:- Identify barriers to discharge w/patient and caregiver- Arrange for needed discharge resources and transportation as appropriate- Identify discharge learning needs (meds, wound care, etc.)- Arrange for interpretive services to assist at discharge as needed- Refer to Case Management Department for coordinating discharge planning if the patient needs post-hospital services based on physician/advanced practitioner order or complex needs related to functional status, cognitive ability, or social support system  Outcome: Progressing     Problem: Knowledge Deficit  Goal: Patient/family/caregiver demonstrates understanding of disease process, treatment plan, medications, and discharge instructions  Description: Complete learning assessment and assess knowledge base.Interventions:- Provide teaching at level of understanding- Provide teaching via preferred learning methods  Outcome: Progressing

## 2025-05-10 NOTE — PLAN OF CARE
Problem: PAIN - ADULT  Goal: Verbalizes/displays adequate comfort level or baseline comfort level  Description: Interventions:- Encourage patient to monitor pain and request assistance- Assess pain using appropriate pain scale- Administer analgesics based on type and severity of pain and evaluate response- Implement non-pharmacological measures as appropriate and evaluate response- Consider cultural and social influences on pain and pain management- Notify physician/advanced practitioner if interventions unsuccessful or patient reports new pain  5/10/2025 1121 by Toya Granados  Outcome: Adequate for Discharge  5/10/2025 0944 by Toya Granados  Outcome: Progressing     Problem: INFECTION - ADULT  Goal: Absence or prevention of progression during hospitalization  Description: INTERVENTIONS:- Assess and monitor for signs and symptoms of infection- Monitor lab/diagnostic results- Monitor all insertion sites, i.e. indwelling lines, tubes, and drains- Monitor endotracheal if appropriate and nasal secretions for changes in amount and color- [place] appropriate cooling/warming therapies per order- Administer medications as ordered- Instruct and encourage patient and family to use good hand hygiene technique- Identify and instruct in appropriate isolation precautions for identified infection/condition  5/10/2025 1121 by Toya Granados  Outcome: Adequate for Discharge  5/10/2025 0944 by Toya Granados  Outcome: Progressing  Goal: Absence of fever/infection during neutropenic period  Description: INTERVENTIONS:- Monitor WBC  5/10/2025 1121 by Toya Granados  Outcome: Adequate for Discharge  5/10/2025 0944 by Toya Granados  Outcome: Progressing     Problem: SAFETY ADULT  Goal: Patient will remain free of falls  Description: INTERVENTIONS:- Educate patient/family on patient safety including physical limitations- Instruct patient to call for assistance with activity - Consult OT/PT to assist with  strengthening/mobility - Keep Call bell within reach- Keep bed low and locked with side rails adjusted as appropriate- Keep care items and personal belongings within reach- Initiate and maintain comfort rounds- Make Fall Risk Sign visible to staff- Offer Toileting every 3 Hours, in advance of need- Initiate/Maintain bed alarm- Obtain necessary fall risk management equipment: - Apply yellow socks and bracelet for high fall risk patients- Consider moving patient to room near nurses station  5/10/2025 1121 by Toya Granados  Outcome: Adequate for Discharge  5/10/2025 0944 by Toya Granados  Outcome: Progressing  Goal: Maintain or return to baseline ADL function  Description: INTERVENTIONS:-  Assess patient's ability to carry out ADLs; assess patient's baseline for ADL function and identify physical deficits which impact ability to perform ADLs (bathing, care of mouth/teeth, toileting, grooming, dressing, etc.)- Assess/evaluate cause of self-care deficits - Assess range of motion- Assess patient's mobility; develop plan if impaired- Assess patient's need for assistive devices and provide as appropriate- Encourage maximum independence but intervene and supervise when necessary- Involve family in performance of ADLs- Assess for home care needs following discharge - Consider OT consult to assist with ADL evaluation and planning for discharge- Provide patient education as appropriate  5/10/2025 1121 by Toya Granados  Outcome: Adequate for Discharge  5/10/2025 0944 by Toya Granados  Outcome: Progressing  Goal: Maintains/Returns to pre admission functional level  Description: INTERVENTIONS:- Perform AM-PAC 6 Click Basic Mobility/ Daily Activity assessment daily.- Set and communicate daily mobility goal to care team and patient/family/caregiver. - Collaborate with rehabilitation services on mobility goals if consulted- Perform Range of Motion 3 times a day.- Reposition patient every 2 hours.- Dangle patient 3 times a  day- Stand patient 3 times a day- Ambulate patient 3 times a day- Out of bed to chair 3 times a day - Out of bed for meals 3 times a day- Out of bed for toileting- Record patient progress and toleration of activity level   5/10/2025 1121 by Toya Granados  Outcome: Adequate for Discharge  5/10/2025 0944 by Toya Granados  Outcome: Progressing     Problem: DISCHARGE PLANNING  Goal: Discharge to home or other facility with appropriate resources  Description: INTERVENTIONS:- Identify barriers to discharge w/patient and caregiver- Arrange for needed discharge resources and transportation as appropriate- Identify discharge learning needs (meds, wound care, etc.)- Arrange for interpretive services to assist at discharge as needed- Refer to Case Management Department for coordinating discharge planning if the patient needs post-hospital services based on physician/advanced practitioner order or complex needs related to functional status, cognitive ability, or social support system  5/10/2025 1121 by Toya Granados  Outcome: Adequate for Discharge  5/10/2025 0944 by Toya Granados  Outcome: Progressing     Problem: Knowledge Deficit  Goal: Patient/family/caregiver demonstrates understanding of disease process, treatment plan, medications, and discharge instructions  Description: Complete learning assessment and assess knowledge base.Interventions:- Provide teaching at level of understanding- Provide teaching via preferred learning methods  5/10/2025 1121 by Toya Granados  Outcome: Adequate for Discharge  5/10/2025 0944 by Toya Granados  Outcome: Progressing

## 2025-05-10 NOTE — NURSING NOTE
IV removed. AVS given to and reviewed with patient using [employer] . Questions addressed. This RN accompanied patient to [facility] pharmacy and picked up medications. Patient discharged.    Toya RUIZN, RN

## 2025-05-10 NOTE — PLAN OF CARE
Problem: PAIN - ADULT  Goal: Verbalizes/displays adequate comfort level or baseline comfort level  Description: Interventions:- Encourage patient to monitor pain and request assistance- Assess pain using appropriate pain scale- Administer analgesics based on type and severity of pain and evaluate response- Implement non-pharmacological measures as appropriate and evaluate response- Consider cultural and social influences on pain and pain management- Notify physician/advanced practitioner if interventions unsuccessful or patient reports new pain  Outcome: Progressing     Problem: INFECTION - ADULT  Goal: Absence or prevention of progression during hospitalization  Description: INTERVENTIONS:- Assess and monitor for signs and symptoms of infection- Monitor lab/diagnostic results- Monitor all insertion sites, i.e. indwelling lines, tubes, and drains- Monitor endotracheal if appropriate and nasal secretions for changes in amount and color- Christiana appropriate cooling/warming therapies per order- Administer medications as ordered- Instruct and encourage patient and family to use good hand hygiene technique- Identify and instruct in appropriate isolation precautions for identified infection/condition  Outcome: Progressing  Goal: Absence of fever/infection during neutropenic period  Description: INTERVENTIONS:- Monitor WBC  Outcome: Progressing     Problem: SAFETY ADULT  Goal: Patient will remain free of falls  Description: INTERVENTIONS:- Educate patient/family on patient safety including physical limitations- Instruct patient to call for assistance with activity - Consult OT/PT to assist with strengthening/mobility - Keep Call bell within reach- Keep bed low and locked with side rails adjusted as appropriate- Keep care items and personal belongings within reach- Initiate and maintain comfort rounds- Make Fall Risk Sign visible to staff- Offer Toileting every 3 Hours, in advance of need- Initiate/Maintain bed alarm-  Obtain necessary fall risk management equipment: - Apply yellow socks and bracelet for high fall risk patients- Consider moving patient to room near nurses station  Outcome: Progressing  Goal: Maintain or return to baseline ADL function  Description: INTERVENTIONS:-  Assess patient's ability to carry out ADLs; assess patient's baseline for ADL function and identify physical deficits which impact ability to perform ADLs (bathing, care of mouth/teeth, toileting, grooming, dressing, etc.)- Assess/evaluate cause of self-care deficits - Assess range of motion- Assess patient's mobility; develop plan if impaired- Assess patient's need for assistive devices and provide as appropriate- Encourage maximum independence but intervene and supervise when necessary- Involve family in performance of ADLs- Assess for home care needs following discharge - Consider OT consult to assist with ADL evaluation and planning for discharge- Provide patient education as appropriate  Outcome: Progressing  Goal: Maintains/Returns to pre admission functional level  Description: INTERVENTIONS:- Perform AM-PAC 6 Click Basic Mobility/ Daily Activity assessment daily.- Set and communicate daily mobility goal to care team and patient/family/caregiver. - Collaborate with rehabilitation services on mobility goals if consulted- Perform Range of Motion 3 times a day.- Reposition patient every 2 hours.- Dangle patient 3 times a day- Stand patient 3 times a day- Ambulate patient 3 times a day- Out of bed to chair 3 times a day - Out of bed for meals 3 times a day- Out of bed for toileting- Record patient progress and toleration of activity level   Outcome: Progressing     Problem: DISCHARGE PLANNING  Goal: Discharge to home or other facility with appropriate resources  Description: INTERVENTIONS:- Identify barriers to discharge w/patient and caregiver- Arrange for needed discharge resources and transportation as appropriate- Identify discharge learning  needs (meds, wound care, etc.)- Arrange for interpretive services to assist at discharge as needed- Refer to Case Management Department for coordinating discharge planning if the patient needs post-hospital services based on physician/advanced practitioner order or complex needs related to functional status, cognitive ability, or social support system  Outcome: Progressing     Problem: Knowledge Deficit  Goal: Patient/family/caregiver demonstrates understanding of disease process, treatment plan, medications, and discharge instructions  Description: Complete learning assessment and assess knowledge base.Interventions:- Provide teaching at level of understanding- Provide teaching via preferred learning methods  Outcome: Progressing

## 2025-05-10 NOTE — ASSESSMENT & PLAN NOTE
TODAY'S (05/09/25) ASSESSMENT   HPI / Last 24 hours See HPI section below for details.   Subjective Currently denies chest pain.  Reports some wheezing.  Reports improved shortness of breath.   VITALS highlights Heart rate ranging 112-120s beats per minute, blood pressure 140s to 170s mmHg over 90s to 110s mmHg, oxygen saturation 95 to 96% on room air.   EXAM highlights Large build, slightly obese, in no respiratory distress, has no JVD or carotid bruit, cardiac exam shows tachycardia, normal intensity heart sounds without appreciable murmur.  Bilateral wheezing is noted without any rales.  There is no tenderness to palpation over the chest wall.  There is no pedal edema.   LABS highlights Normal renal function and electrolytes, negative high-sensitivity troponins x 1, normal WBC count, elevated eosinophil count otherwise normal remainder CBC, negative testing for influenza RSV and COVID, CRP elevated at 15.6   ECHO and other studies CXR shows normal cardiomediastinal silhouette without acute cardiopulmonary process.  No recent echocardiogram.   Telemetry & ECG Sinus tachycardia, narrow QRS complexes.   Current GDMT Has been started on high-dose ibuprofen 800 3 times daily and colchicine 0.6 mg twice daily.       Current ECG   Previous ECG from November 2023       Quick resolution of patient's symptoms and lack of fevers and findings of ECG changes that were present previously also suggests less likely that this is pericarditis.      PLAN:  Recommend treatment for allergic asthma exacerbation.    Will assess tomorrow and probably discontinue ibuprofen and colchicine.  Consider adding antihistamine therapy as well.  Can hold off on echocardiogram unless patient is having recurring chest pain.  If patient is better and based to baseline and has no symptoms then can likely be discharged to home tomorrow.

## 2025-05-10 NOTE — CASE MANAGEMENT
Case Management Discharge Planning Note    Patient name Evelin Christianson  Location [facility] /E[facility] -* MRN [id number]  : 1990 Date 5/10/2025       Current Admission Date: 2025  Current Admission Diagnosis:Moderate persistent asthma with exacerbation   Patient Active Problem List    Diagnosis Date Noted Date Diagnosed    Essential hypertension 05/10/2025     Moderate persistent asthma with exacerbation 2025     Pericarditis 2025       LOS (days): 0  Geometric Mean LOS (GMLOS) (days):   Days to GMLOS:     OBJECTIVE:            Current admission status: Observation   Preferred Pharmacy:   [id number] [place] [facility] [address]  [address]  [address]  [facility] [address]  Phone: [phone] Fax: [phone]    [facility] [place] [place] - [address]  [facility],  [address]  [facility],  First [address] [facility] [address]  Phone: [phone] Fax: [phone]    Primary Care Provider: No primary care provider on file.    Primary Insurance:   Secondary Insurance:     DISCHARGE DETAILS:    Patient without insurance, PATHS referral sent. Patient reports having [place] Medicaid in the past.      Indigent meds needed, total is $281.62.  Indigent form faxed to [facility], patient will  at time of DC.

## 2025-05-10 NOTE — ASSESSMENT & PLAN NOTE
Patient experienced sharp chest pain that radiated to back  Evaluated by cardiology, this is felt to be noncardiac chest pain in setting of exacerbation and coughing as stated above, also question if related to elevated BP  Chest x-ray without pneumothorax or infiltrate  No clinical suspicion for pericarditis, no evidence of ACS  Inflammatory markers not significantly elevated, no positional relation to pain  Patient has been chest pain-free  He remains on room air, D-dimer negative  No further workup indicated, cardiology cleared patient for discharge

## 2025-05-10 NOTE — DISCHARGE SUMMARY
Discharge Summary - Hospitalist   Name: Evelin Christianson 34 y.o. male I MRN: [id number]  Unit/Bed#: E4 -01 I Date of Admission: 5/9/2025   Date of Service: 5/10/2025 I Hospital Day: 0     Assessment & Plan  Moderate persistent asthma with exacerbation  Patient has a history of asthma, previously seen by [facility] pulmonology.  Patient also has seasonal allergies and has not been taking antihistamines  Denies nicotine use  Medication noncompliance, off of his Wixela  Status post IV Solu-Medrol nebulizers with improvement in chest pain and wheezing  He is on room air, no significant wheezing bedside today  Chest x-ray without infiltrate, viral panel negative  Remains afebrile  Reports all of his presenting symptoms are resolving  Resume home Wixela, albuterol rescue inhaler as needed and nebulizer as needed  Restart antihistamine at home  Continue oral prednisone 40 mg to complete week course  Outpatient pulmonology follow-up for formal PFTs once recovered  Essential hypertension  Blood pressure significantly elevated on admission, still suboptimal control in the 150s  Patient has not been taking medications  Resume home lisinopril 10 mg daily, encourage outpatient PCP follow up  Med refill sent  Chest pain (Resolved: 5/10/2025)  Patient experienced sharp chest pain that radiated to back  Evaluated by cardiology, this is felt to be noncardiac chest pain in setting of exacerbation and coughing as stated above, also question if related to elevated BP  Chest x-ray without pneumothorax or infiltrate  No clinical suspicion for pericarditis, no evidence of ACS  Inflammatory markers not significantly elevated, no positional relation to pain  Patient has been chest pain-free  He remains on room air, D-dimer negative  No further workup indicated, cardiology cleared patient for discharge     Medical Problems      Discharging Physician / Practitioner: Shavonne Luna PA-C  PCP: No primary care provider on file.  Admission  Date:   Admission Orders (From admission, onward)       Ordered        05/09/25 1715  Place in Observation  Once                          Discharge Date: 05/10/25    Consultations During Hospital Stay:  Cardiology    Procedures Performed:   None    Significant Findings / Test Results:   XR chest 2 views  Result Date: 5/9/2025  Impression: No acute cardiopulmonary disease. Workstation performed: [id number]      Outpatient Tests Requested:  Formal PFT's  Rest of testing per PCP    Reason for Admission: Asthma exacerbation    Hospital Course:   Evelin Christianson is a 34 y.o. male patient who originally presented to the hospital on 5/9/2025 due to progressive cough with wheezing, shortness of breath.  Patient was admitted for asthma exacerbation and placed on IV steroids with nebulizer therapy.  While in the emergency room patient was evaluated by cardiology due to development of sharp chest pain and diffuse concave ST elevation suspicious for pericarditis.  Given patient's quick resolution of chest pain symptoms, low inflammatory markers, lack of fevers and previous findings of ECG changes that were present previously, cardiology did not have strong suspicion for pericarditis.  On day of discharge patient reports his presenting symptoms have resolved.  He remains without hypoxia on room air and his wheezing and shortness of breath has resolved.  He has not endorsed any further chest pain and has been monitored on telemetry without any significant arrhythmias.  Patient's workup was unremarkable for any infiltrate, pneumothorax, viral infection.  His D-dimer was negative.  Patient was noted to have elevated BP in the emergency room and throughout admission and was resumed on his home antihypertensive lisinopril which he was not compliant with taking.  Patient will be resumed on his home maintenance inhaler as well as rescue inhaler, antihistamine and antihypertensive.  He was recommended medication compliance and close  "PCP follow-up in the outpatient setting as well as formal PFTs once he recovers with pulmonology follow-up.  Case discussed with cardiology on day of discharge and patient was medically cleared.    Please see above list of diagnoses and related plan for additional information.     Condition at Discharge: good    Discharge Day Visit / Exam:   Subjective: Patient seen and examined.  Omani translation used.  We reviewed what brought him in.  Patient notes he feels better and wants to know if he can go today.  Denies any current chest pain or shortness of breath.    Vitals: Blood Pressure: 156/98 (05/10/25 1051)  Pulse: 101 (05/10/25 1051)  Temperature: 97.8 °F (36.6 °C) (05/10/25 1051)  Temp Source: Temporal (05/10/25 1051)  Respirations: 18 (05/10/25 1051)  Height: 5' 9\" (175.3 cm) (05/09/25 1800)  Weight - Scale: 91.7 kg (202 lb 2.6 oz) (05/09/25 1800)  SpO2: 94 % (05/10/25 1051)    Physical Exam  Constitutional:       General: He is not in acute distress.     Appearance: Normal appearance. He is obese. He is not ill-appearing, toxic-appearing or diaphoretic.   Eyes:      General:         Right eye: No discharge.         Left eye: No discharge.      Comments: Bilateral erythematous sclera   Cardiovascular:      Rate and Rhythm: Normal rate and regular rhythm.   Pulmonary:      Effort: Pulmonary effort is normal. No respiratory distress.      Breath sounds: Normal breath sounds. No wheezing or rales.   Chest:      Chest wall: No tenderness.   Abdominal:      General: Bowel sounds are normal.      Palpations: Abdomen is soft.      Tenderness: There is no abdominal tenderness.   Musculoskeletal:      Right lower leg: No edema.      Left lower leg: No edema.   Skin:     General: Skin is warm and dry.   Neurological:      Mental Status: He is alert and oriented to person, place, and time. Mental status is at baseline.   Psychiatric:         Mood and Affect: Mood normal.         Behavior: Behavior normal.      "   Discharge instructions/Information to patient and family:   See after visit summary for information provided to patient and family.      Provisions for Follow-Up Care:  See after visit summary for information related to follow-up care and any pertinent home health orders.      Mobility at time of Discharge:   Basic Mobility Inpatient Raw Score: 24  JH-HLM Goal: 8: Walk 250 feet or more  JH-HLM Achieved: 8: Walk 250 feet ot more  HLM Goal achieved. Continue to encourage appropriate mobility.     Disposition:   Home    Planned Readmission: No    Discharge Medications:  See after visit summary for reconciled discharge medications provided to patient and/or family.      Administrative Statements   Discharge Statement:  I have spent a total time of 30 minutes in caring for this patient on the day of the visit/encounter.     **Please Note: This note may have been constructed using a voice recognition system**

## 2025-05-10 NOTE — ASSESSMENT & PLAN NOTE
Blood pressure significantly elevated on admission, still suboptimal control in the 150s  Patient has not been taking medications  Resume home lisinopril 10 mg daily, encourage outpatient PCP follow up  Med refill sent

## 2025-05-11 LAB
ATRIAL RATE: 96 BPM
ATRIAL RATE: 99 BPM
P AXIS: 66 DEGREES
P AXIS: 73 DEGREES
PR INTERVAL: 148 MS
PR INTERVAL: 150 MS
QRS AXIS: 77 DEGREES
QRS AXIS: 77 DEGREES
QRSD INTERVAL: 88 MS
QRSD INTERVAL: 90 MS
QT INTERVAL: 344 MS
QT INTERVAL: 354 MS
QTC INTERVAL: 442 MS
QTC INTERVAL: 448 MS
T WAVE AXIS: 52 DEGREES
T WAVE AXIS: 55 DEGREES
VENTRICULAR RATE: 96 BPM
VENTRICULAR RATE: 99 BPM

## 2025-05-11 PROCEDURE — 93010 ELECTROCARDIOGRAM REPORT: CPT | Performed by: INTERNAL MEDICINE

## 2025-08-01 ENCOUNTER — OFFICE VISIT (OUTPATIENT)
Dept: FAMILY MEDICINE CLINIC | Facility: CLINIC | Age: 35
End: 2025-08-01

## 2025-08-01 VITALS
TEMPERATURE: 98.4 F | DIASTOLIC BLOOD PRESSURE: 68 MMHG | OXYGEN SATURATION: 97 % | BODY MASS INDEX: 29.92 KG/M2 | WEIGHT: 202 LBS | HEIGHT: 69 IN | HEART RATE: 87 BPM | SYSTOLIC BLOOD PRESSURE: 122 MMHG | RESPIRATION RATE: 16 BRPM

## 2025-08-01 DIAGNOSIS — E11.8 TYPE II DIABETES MELLITUS WITH MANIFESTATIONS (HCC): ICD-10-CM

## 2025-08-01 DIAGNOSIS — J45.41 MODERATE PERSISTENT ASTHMA WITH EXACERBATION: Primary | ICD-10-CM

## 2025-08-01 DIAGNOSIS — I30.0 IDIOPATHIC PERICARDITIS, UNSPECIFIED CHRONICITY: ICD-10-CM

## 2025-08-01 DIAGNOSIS — I10 ESSENTIAL HYPERTENSION: ICD-10-CM

## 2025-08-01 DIAGNOSIS — E04.1 THYROID NODULE: ICD-10-CM

## 2025-08-01 DIAGNOSIS — R21 RASH: ICD-10-CM

## 2025-08-01 LAB — SL AMB POCT HEMOGLOBIN AIC: 6.4 (ref ?–6.5)

## 2025-08-01 RX ORDER — CETIRIZINE HYDROCHLORIDE 10 MG/1
10 TABLET ORAL
Qty: 90 TABLET | Refills: 1 | Status: SHIPPED | OUTPATIENT
Start: 2025-08-01

## 2025-08-01 RX ORDER — DOXYCYCLINE 100 MG/1
100 CAPSULE ORAL
Qty: 20 CAPSULE | Refills: 0 | Status: SHIPPED | OUTPATIENT
Start: 2025-08-01 | End: 2025-08-11

## 2025-08-01 RX ORDER — FEXOFENADINE HCL 180 MG/1
180 TABLET ORAL
Qty: 90 TABLET | Refills: 3 | Status: SHIPPED | OUTPATIENT
Start: 2025-08-01

## 2025-08-01 RX ORDER — METFORMIN HYDROCHLORIDE 500 MG/1
500 TABLET, EXTENDED RELEASE ORAL
Qty: 90 TABLET | Refills: 3 | Status: SHIPPED | OUTPATIENT
Start: 2025-08-01

## 2025-08-01 RX ORDER — TRIAMCINOLONE ACETONIDE 40 MG/ML
40 INJECTION, SUSPENSION INTRA-ARTICULAR; INTRAMUSCULAR ONCE
Status: COMPLETED | OUTPATIENT
Start: 2025-08-01 | End: 2025-08-01

## 2025-08-01 RX ORDER — PREDNISONE 5 MG/1
5 TABLET ORAL
Qty: 30 TABLET | Refills: 0 | Status: SHIPPED | OUTPATIENT
Start: 2025-08-01

## 2025-08-01 RX ADMIN — TRIAMCINOLONE ACETONIDE 40 MG: 40 INJECTION, SUSPENSION INTRA-ARTICULAR; INTRAMUSCULAR at 09:07

## 2025-08-12 ENCOUNTER — APPOINTMENT (OUTPATIENT)
Dept: LAB | Facility: HOSPITAL | Age: 35
End: 2025-08-12

## 2025-08-14 ENCOUNTER — TELEPHONE (OUTPATIENT)
Age: 35
End: 2025-08-14

## 2025-08-14 ENCOUNTER — HOSPITAL ENCOUNTER (OUTPATIENT)
Dept: ULTRASOUND IMAGING | Facility: HOSPITAL | Age: 35
Discharge: HOME | End: 2025-08-14
Attending: INTERNAL MEDICINE

## 2025-08-15 DIAGNOSIS — Z91.010 PEANUT ALLERGY: Primary | ICD-10-CM

## 2025-08-18 ENCOUNTER — OFFICE VISIT (OUTPATIENT)
Dept: FAMILY MEDICINE CLINIC | Facility: CLINIC | Age: 35
End: 2025-08-18

## 2025-08-18 DIAGNOSIS — R31.29 MICROSCOPIC HEMATURIA: Primary | ICD-10-CM

## 2025-08-18 LAB
SL AMB  POCT GLUCOSE, UA: ABNORMAL
SL AMB LEUKOCYTE ESTERASE,UA: ABNORMAL
SL AMB POCT BILIRUBIN,UA: ABNORMAL
SL AMB POCT BLOOD,UA: 80
SL AMB POCT CLARITY,UA: CLEAR
SL AMB POCT COLOR,UA: YELLOW
SL AMB POCT KETONES,UA: ABNORMAL
SL AMB POCT NITRITE,UA: ABNORMAL
SL AMB POCT PH,UA: 6
SL AMB POCT SPECIFIC GRAVITY,UA: 1.03
SL AMB POCT URINE PROTEIN: ABNORMAL
SL AMB POCT UROBILINOGEN: 0.2

## 2025-08-18 PROCEDURE — 99211 OFF/OP EST MAY X REQ PHY/QHP: CPT

## 2025-08-18 PROCEDURE — 81002 URINALYSIS NONAUTO W/O SCOPE: CPT

## 2025-08-22 ENCOUNTER — HOSPITAL ENCOUNTER (OUTPATIENT)
Dept: ULTRASOUND IMAGING | Facility: HOSPITAL | Age: 35
Discharge: HOME | End: 2025-08-22
Attending: INTERNAL MEDICINE
Payer: COMMERCIAL

## 2025-08-22 DIAGNOSIS — R31.29 MICROSCOPIC HEMATURIA: ICD-10-CM

## 2025-08-22 PROCEDURE — 76775 US EXAM ABDO BACK WALL LIM: CPT
